# Patient Record
Sex: MALE | Race: WHITE | Employment: FULL TIME | URBAN - METROPOLITAN AREA
[De-identification: names, ages, dates, MRNs, and addresses within clinical notes are randomized per-mention and may not be internally consistent; named-entity substitution may affect disease eponyms.]

---

## 2017-05-25 ENCOUNTER — OFFICE VISIT (OUTPATIENT)
Dept: FAMILY MEDICINE CLINIC | Age: 67
End: 2017-05-25

## 2017-05-25 VITALS
BODY MASS INDEX: 31.8 KG/M2 | HEART RATE: 92 BPM | DIASTOLIC BLOOD PRESSURE: 78 MMHG | WEIGHT: 202.6 LBS | RESPIRATION RATE: 16 BRPM | SYSTOLIC BLOOD PRESSURE: 142 MMHG | HEIGHT: 67 IN

## 2017-05-25 DIAGNOSIS — E66.9 OBESITY, CLASS I, BMI 30-34.9: Primary | ICD-10-CM

## 2017-05-25 DIAGNOSIS — J45.20 RAD (REACTIVE AIRWAY DISEASE), MILD INTERMITTENT, UNCOMPLICATED: ICD-10-CM

## 2017-05-25 DIAGNOSIS — I10 ESSENTIAL HYPERTENSION: ICD-10-CM

## 2017-05-25 PROCEDURE — 1123F ACP DISCUSS/DSCN MKR DOCD: CPT | Performed by: FAMILY MEDICINE

## 2017-05-25 PROCEDURE — 99214 OFFICE O/P EST MOD 30 MIN: CPT | Performed by: FAMILY MEDICINE

## 2017-05-25 PROCEDURE — G8417 CALC BMI ABV UP PARAM F/U: HCPCS | Performed by: FAMILY MEDICINE

## 2017-05-25 PROCEDURE — 4040F PNEUMOC VAC/ADMIN/RCVD: CPT | Performed by: FAMILY MEDICINE

## 2017-05-25 PROCEDURE — 1036F TOBACCO NON-USER: CPT | Performed by: FAMILY MEDICINE

## 2017-05-25 PROCEDURE — G8427 DOCREV CUR MEDS BY ELIG CLIN: HCPCS | Performed by: FAMILY MEDICINE

## 2017-05-25 PROCEDURE — 3017F COLORECTAL CA SCREEN DOC REV: CPT | Performed by: FAMILY MEDICINE

## 2017-05-25 RX ORDER — ALBUTEROL SULFATE 90 UG/1
1-2 AEROSOL, METERED RESPIRATORY (INHALATION) EVERY 4 HOURS PRN
Qty: 1 INHALER | Refills: 3 | Status: SHIPPED | OUTPATIENT
Start: 2017-05-25 | End: 2018-04-04 | Stop reason: SDUPTHER

## 2017-05-25 ASSESSMENT — PATIENT HEALTH QUESTIONNAIRE - PHQ9
SUM OF ALL RESPONSES TO PHQ9 QUESTIONS 1 & 2: 0
1. LITTLE INTEREST OR PLEASURE IN DOING THINGS: 0
SUM OF ALL RESPONSES TO PHQ QUESTIONS 1-9: 0
2. FEELING DOWN, DEPRESSED OR HOPELESS: 0

## 2017-05-25 ASSESSMENT — ENCOUNTER SYMPTOMS
ALLERGIC/IMMUNOLOGIC NEGATIVE: 1
RESPIRATORY NEGATIVE: 1
COUGH: 0
BACK PAIN: 0
GASTROINTESTINAL NEGATIVE: 1
SHORTNESS OF BREATH: 0

## 2018-03-23 ENCOUNTER — OFFICE VISIT (OUTPATIENT)
Dept: FAMILY MEDICINE CLINIC | Age: 68
End: 2018-03-23
Payer: MEDICARE

## 2018-03-23 VITALS
DIASTOLIC BLOOD PRESSURE: 92 MMHG | TEMPERATURE: 98.1 F | BODY MASS INDEX: 30.1 KG/M2 | RESPIRATION RATE: 16 BRPM | WEIGHT: 191.8 LBS | HEIGHT: 67 IN | HEART RATE: 92 BPM | OXYGEN SATURATION: 94 % | SYSTOLIC BLOOD PRESSURE: 148 MMHG

## 2018-03-23 DIAGNOSIS — J22 LRTI (LOWER RESPIRATORY TRACT INFECTION): Primary | ICD-10-CM

## 2018-03-23 PROCEDURE — G8427 DOCREV CUR MEDS BY ELIG CLIN: HCPCS | Performed by: FAMILY MEDICINE

## 2018-03-23 PROCEDURE — 3017F COLORECTAL CA SCREEN DOC REV: CPT | Performed by: FAMILY MEDICINE

## 2018-03-23 PROCEDURE — G8417 CALC BMI ABV UP PARAM F/U: HCPCS | Performed by: FAMILY MEDICINE

## 2018-03-23 PROCEDURE — G8484 FLU IMMUNIZE NO ADMIN: HCPCS | Performed by: FAMILY MEDICINE

## 2018-03-23 PROCEDURE — 99213 OFFICE O/P EST LOW 20 MIN: CPT | Performed by: FAMILY MEDICINE

## 2018-03-23 PROCEDURE — 1036F TOBACCO NON-USER: CPT | Performed by: FAMILY MEDICINE

## 2018-03-23 PROCEDURE — 1123F ACP DISCUSS/DSCN MKR DOCD: CPT | Performed by: FAMILY MEDICINE

## 2018-03-23 PROCEDURE — 4040F PNEUMOC VAC/ADMIN/RCVD: CPT | Performed by: FAMILY MEDICINE

## 2018-03-23 RX ORDER — MULTIVIT-MIN/IRON/FOLIC ACID/K 18-600-40
4000 CAPSULE ORAL DAILY
COMMUNITY

## 2018-03-23 RX ORDER — PREDNISONE 20 MG/1
20 TABLET ORAL 2 TIMES DAILY
Qty: 10 TABLET | Refills: 0 | Status: SHIPPED | OUTPATIENT
Start: 2018-03-23 | End: 2018-03-28

## 2018-03-23 RX ORDER — LEVOFLOXACIN 750 MG/1
750 TABLET ORAL DAILY
Qty: 5 TABLET | Refills: 0 | Status: SHIPPED | OUTPATIENT
Start: 2018-03-23 | End: 2018-03-28

## 2018-03-23 RX ORDER — MAG HYDROX/ALUMINUM HYD/SIMETH 400-400-40
SUSPENSION, ORAL (FINAL DOSE FORM) ORAL 2 TIMES DAILY
Status: ON HOLD | COMMUNITY
End: 2020-03-10 | Stop reason: HOSPADM

## 2018-03-23 ASSESSMENT — ENCOUNTER SYMPTOMS
COUGH: 1
WHEEZING: 1
RHINORRHEA: 0
SINUS PAIN: 0
SHORTNESS OF BREATH: 1
GASTROINTESTINAL NEGATIVE: 1
CHEST TIGHTNESS: 1
SINUS PRESSURE: 0

## 2018-03-23 NOTE — PROGRESS NOTES
Subjective:      Patient ID: Rafa Arauz is a 80 y.o. male. HPI:    Chief Complaint   Patient presents with    Chest Congestion     C/o chest congestion and productive cough with yellow sputum x 7-9 days. Pt here for chest congestion, productive cough for almost 10 days now. No fevers. No sinus pressure or congestion. Non-smoker. Some wheezing. 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    Has Ventolin at home, has been using this also which helps. Patient Active Problem List   Diagnosis    HTN (hypertension)    AR (allergic rhinitis)    RAD (reactive airway disease)    Lower respiratory infection    Medication monitoring encounter    Obesity, Class I, BMI 30-34.9    FH: prostate cancer, father and brother.  S/P left inguinal hernia repair    S/P orchiectomy     Past Surgical History:   Procedure Laterality Date    HERNIA REPAIR  5/30/12    [facility] Dr. Celestina Estrada HERNIA REPAIR  2-4-14    Repair recurrent incarcerated left inguinal hernia with mesh    MOUTH SURGERY      several years ago-removed 2-3 back teeth-wisdom teeth     Prior to Admission medications    Medication Sig Start Date End Date Taking?  Authorizing Provider   Misc Natural Products (GLUCOSAMINE CHOND COMPLEX/MSM PO) Take by mouth daily   Yes Historical Provider, MD   Omega-3 Fatty Acids (FISH OIL PO) Take by mouth daily   Yes Historical Provider, MD   Multiple Vitamins-Minerals (MULTIVITAMIN PO) Take by mouth daily   Yes Historical Provider, MD   Coenzyme Q10 (CO Q 10 PO) Take by mouth daily   Yes Historical Provider, MD Ramos Rembert 450 MG CAPS Take by mouth 2 times daily   Yes Historical Provider, MD   Cholecalciferol (VITAMIN D) 2000 units CAPS capsule Take 4,000 Units by mouth daily   Yes Historical Provider, MD   albuterol sulfate HFA (VENTOLIN HFA) 108 (90 BASE) MCG/ACT inhaler Inhale 1-2 puffs into the lungs every 4 hours as needed for Wheezing or Shortness of Breath 5/25/17  Yes Shauna Velazquez MD         Review of Systems   Constitutional: Negative. Negative for fever. HENT: Negative. Negative for congestion, rhinorrhea, sinus pain and sinus pressure. Respiratory: Positive for cough, chest tightness, shortness of breath and wheezing. Cardiovascular: Negative. Gastrointestinal: Negative. Musculoskeletal: Negative. All other systems reviewed and are negative. Objective:   Physical Exam   Constitutional: He is oriented to person, place, and time. He appears well-developed and well-nourished. HENT:   Head: Normocephalic and atraumatic. Right Ear: Tympanic membrane normal.   Left Ear: Tympanic membrane normal.   Mouth/Throat: Oropharynx is clear and moist and mucous membranes are normal.   Cardiovascular: Normal rate, regular rhythm and normal heart sounds. No murmur heard. Pulmonary/Chest: Effort normal. He has decreased breath sounds. He has wheezes. He has rhonchi. Abdominal: Soft. Bowel sounds are normal.   Musculoskeletal: He exhibits no edema. Neurological: He is alert and oriented to person, place, and time. Skin: Skin is warm and dry. Psychiatric: He has a normal mood and affect. His behavior is normal.   Nursing note and vitals reviewed. Assessment:      1.  LRTI (lower respiratory tract infection)  levofloxacin (LEVAQUIN) 750 MG tablet    predniSONE (DELTASONE) 20 MG tablet           Plan:      -  Orders above  -  Continue Mucinex and Ventolin  -  RTO if worsening symptoms

## 2018-04-04 DIAGNOSIS — J45.20 RAD (REACTIVE AIRWAY DISEASE), MILD INTERMITTENT, UNCOMPLICATED: ICD-10-CM

## 2018-04-04 RX ORDER — ALBUTEROL SULFATE 90 UG/1
1-2 AEROSOL, METERED RESPIRATORY (INHALATION) EVERY 4 HOURS PRN
Qty: 1 INHALER | Refills: 3 | Status: SHIPPED | OUTPATIENT
Start: 2018-04-04 | End: 2019-07-22 | Stop reason: SDUPTHER

## 2018-05-24 ENCOUNTER — OFFICE VISIT (OUTPATIENT)
Dept: FAMILY MEDICINE CLINIC | Age: 68
End: 2018-05-24
Payer: MEDICARE

## 2018-05-24 VITALS
HEIGHT: 67 IN | OXYGEN SATURATION: 98 % | WEIGHT: 193.1 LBS | BODY MASS INDEX: 30.31 KG/M2 | HEART RATE: 82 BPM | SYSTOLIC BLOOD PRESSURE: 128 MMHG | DIASTOLIC BLOOD PRESSURE: 76 MMHG | RESPIRATION RATE: 13 BRPM

## 2018-05-24 DIAGNOSIS — Z00.00 ROUTINE GENERAL MEDICAL EXAMINATION AT A HEALTH CARE FACILITY: Primary | ICD-10-CM

## 2018-05-24 DIAGNOSIS — Z00.00 MEDICARE WELCOME EXAM: ICD-10-CM

## 2018-05-24 PROCEDURE — G0438 PPPS, INITIAL VISIT: HCPCS | Performed by: FAMILY MEDICINE

## 2018-05-24 PROCEDURE — 4040F PNEUMOC VAC/ADMIN/RCVD: CPT | Performed by: FAMILY MEDICINE

## 2018-05-24 ASSESSMENT — ANXIETY QUESTIONNAIRES: GAD7 TOTAL SCORE: 0

## 2018-05-24 ASSESSMENT — LIFESTYLE VARIABLES: HOW OFTEN DO YOU HAVE A DRINK CONTAINING ALCOHOL: 0

## 2018-05-24 ASSESSMENT — PATIENT HEALTH QUESTIONNAIRE - PHQ9: SUM OF ALL RESPONSES TO PHQ QUESTIONS 1-9: 0

## 2018-05-30 ENCOUNTER — TELEPHONE (OUTPATIENT)
Dept: FAMILY MEDICINE CLINIC | Age: 68
End: 2018-05-30

## 2018-05-30 DIAGNOSIS — I10 ESSENTIAL HYPERTENSION: ICD-10-CM

## 2018-05-30 RX ORDER — CLONIDINE HYDROCHLORIDE 0.2 MG/1
TABLET ORAL
Qty: 180 TABLET | Refills: 3 | Status: SHIPPED | OUTPATIENT
Start: 2018-05-30 | End: 2019-05-24 | Stop reason: SDUPTHER

## 2018-05-30 RX ORDER — HYDROCHLOROTHIAZIDE 25 MG/1
25 TABLET ORAL DAILY
Qty: 90 TABLET | Refills: 3 | Status: SHIPPED | OUTPATIENT
Start: 2018-05-30 | End: 2019-05-24 | Stop reason: SDUPTHER

## 2018-05-30 RX ORDER — LOSARTAN POTASSIUM 100 MG/1
TABLET ORAL
Qty: 90 TABLET | Refills: 3 | Status: SHIPPED | OUTPATIENT
Start: 2018-05-30 | End: 2019-05-24

## 2018-06-23 PROBLEM — Z00.00 MEDICARE WELCOME EXAM: Status: RESOLVED | Noted: 2018-05-24 | Resolved: 2018-06-23

## 2019-01-07 ENCOUNTER — OFFICE VISIT (OUTPATIENT)
Dept: FAMILY MEDICINE CLINIC | Age: 69
End: 2019-01-07
Payer: MEDICARE

## 2019-01-07 VITALS
DIASTOLIC BLOOD PRESSURE: 80 MMHG | HEIGHT: 67 IN | OXYGEN SATURATION: 97 % | HEART RATE: 102 BPM | WEIGHT: 201.3 LBS | TEMPERATURE: 98.6 F | SYSTOLIC BLOOD PRESSURE: 128 MMHG | BODY MASS INDEX: 31.6 KG/M2 | RESPIRATION RATE: 16 BRPM

## 2019-01-07 DIAGNOSIS — J32.9 SINOBRONCHITIS: ICD-10-CM

## 2019-01-07 DIAGNOSIS — J40 SINOBRONCHITIS: ICD-10-CM

## 2019-01-07 DIAGNOSIS — I10 ESSENTIAL HYPERTENSION: Primary | ICD-10-CM

## 2019-01-07 PROCEDURE — 4040F PNEUMOC VAC/ADMIN/RCVD: CPT | Performed by: FAMILY MEDICINE

## 2019-01-07 PROCEDURE — G8427 DOCREV CUR MEDS BY ELIG CLIN: HCPCS | Performed by: FAMILY MEDICINE

## 2019-01-07 PROCEDURE — G8417 CALC BMI ABV UP PARAM F/U: HCPCS | Performed by: FAMILY MEDICINE

## 2019-01-07 PROCEDURE — 1036F TOBACCO NON-USER: CPT | Performed by: FAMILY MEDICINE

## 2019-01-07 PROCEDURE — G8484 FLU IMMUNIZE NO ADMIN: HCPCS | Performed by: FAMILY MEDICINE

## 2019-01-07 PROCEDURE — 1123F ACP DISCUSS/DSCN MKR DOCD: CPT | Performed by: FAMILY MEDICINE

## 2019-01-07 PROCEDURE — 1101F PT FALLS ASSESS-DOCD LE1/YR: CPT | Performed by: FAMILY MEDICINE

## 2019-01-07 PROCEDURE — 3017F COLORECTAL CA SCREEN DOC REV: CPT | Performed by: FAMILY MEDICINE

## 2019-01-07 PROCEDURE — 99213 OFFICE O/P EST LOW 20 MIN: CPT | Performed by: FAMILY MEDICINE

## 2019-01-07 RX ORDER — AZITHROMYCIN 250 MG/1
TABLET, FILM COATED ORAL
Qty: 1 PACKET | Refills: 0 | Status: SHIPPED | OUTPATIENT
Start: 2019-01-07 | End: 2019-04-24 | Stop reason: ALTCHOICE

## 2019-01-07 ASSESSMENT — ENCOUNTER SYMPTOMS
RHINORRHEA: 1
SHORTNESS OF BREATH: 0
WHEEZING: 1
SINUS PAIN: 0
COUGH: 1
SINUS PRESSURE: 1

## 2019-02-26 ENCOUNTER — TELEPHONE (OUTPATIENT)
Dept: FAMILY MEDICINE CLINIC | Age: 69
End: 2019-02-26

## 2019-04-24 ENCOUNTER — OFFICE VISIT (OUTPATIENT)
Dept: FAMILY MEDICINE CLINIC | Age: 69
End: 2019-04-24
Payer: MEDICARE

## 2019-04-24 VITALS
WEIGHT: 203.9 LBS | HEART RATE: 80 BPM | DIASTOLIC BLOOD PRESSURE: 96 MMHG | SYSTOLIC BLOOD PRESSURE: 160 MMHG | BODY MASS INDEX: 32 KG/M2 | HEIGHT: 67 IN | RESPIRATION RATE: 20 BRPM

## 2019-04-24 DIAGNOSIS — R22.31 LOCALIZED SWELLING ON RIGHT HAND: Primary | ICD-10-CM

## 2019-04-24 PROCEDURE — 1123F ACP DISCUSS/DSCN MKR DOCD: CPT | Performed by: FAMILY MEDICINE

## 2019-04-24 PROCEDURE — 1036F TOBACCO NON-USER: CPT | Performed by: FAMILY MEDICINE

## 2019-04-24 PROCEDURE — 99213 OFFICE O/P EST LOW 20 MIN: CPT | Performed by: FAMILY MEDICINE

## 2019-04-24 PROCEDURE — G8427 DOCREV CUR MEDS BY ELIG CLIN: HCPCS | Performed by: FAMILY MEDICINE

## 2019-04-24 PROCEDURE — 4040F PNEUMOC VAC/ADMIN/RCVD: CPT | Performed by: FAMILY MEDICINE

## 2019-04-24 PROCEDURE — G8417 CALC BMI ABV UP PARAM F/U: HCPCS | Performed by: FAMILY MEDICINE

## 2019-04-24 PROCEDURE — 3017F COLORECTAL CA SCREEN DOC REV: CPT | Performed by: FAMILY MEDICINE

## 2019-04-24 PROCEDURE — G8510 SCR DEP NEG, NO PLAN REQD: HCPCS | Performed by: FAMILY MEDICINE

## 2019-04-24 RX ORDER — CEFADROXIL 500 MG/1
500 CAPSULE ORAL 2 TIMES DAILY
Qty: 20 CAPSULE | Refills: 0 | Status: SHIPPED | OUTPATIENT
Start: 2019-04-24 | End: 2019-05-04

## 2019-04-24 RX ORDER — PREDNISONE 20 MG/1
20 TABLET ORAL 2 TIMES DAILY
Qty: 14 TABLET | Refills: 0 | Status: SHIPPED | OUTPATIENT
Start: 2019-04-24 | End: 2019-05-01

## 2019-04-24 ASSESSMENT — ENCOUNTER SYMPTOMS
GASTROINTESTINAL NEGATIVE: 1
RESPIRATORY NEGATIVE: 1

## 2019-04-24 ASSESSMENT — PATIENT HEALTH QUESTIONNAIRE - PHQ9
1. LITTLE INTEREST OR PLEASURE IN DOING THINGS: 0
SUM OF ALL RESPONSES TO PHQ9 QUESTIONS 1 & 2: 0
2. FEELING DOWN, DEPRESSED OR HOPELESS: 0
SUM OF ALL RESPONSES TO PHQ QUESTIONS 1-9: 0
SUM OF ALL RESPONSES TO PHQ QUESTIONS 1-9: 0

## 2019-04-24 NOTE — PROGRESS NOTES
Visit Information    Have you changed or started any medications since your last visit including any over-the-counter medicines, vitamins, or herbal medicines? no   Are you having any side effects from any of your medications? -  no  Have you stopped taking any of your medications? Is so, why? -  no    Have you seen any other physician or provider since your last visit? No  Have you had any other diagnostic tests since your last visit? No  Have you been seen in the emergency room and/or had an admission to a hospital since we last saw you? No  Have you had your routine dental cleaning in the past 6 months? no    Have you activated your [employer] account? If not, what are your barriers?  Yes     Patient Care Team:  Grecia Ni MD as PCP - General (Family Medicine)  Grecia Ni MD as PCP - [facility] Attributed Provider    Medical History Review  Past Medical, Family, and Social History reviewed and does not contribute to the patient presenting condition    Health Maintenance   Topic Date Due    DTaP/Tdap/Td vaccine (1 - Tdap) 01/12/1970    Shingles Vaccine (1 of 2) 01/12/2001    Colon cancer screen colonoscopy  01/12/2001    Pneumococcal 65+ years Vaccine (1 of 2 - PCV13) 01/12/2016    Potassium monitoring  05/05/2019    Creatinine monitoring  05/05/2019    Flu vaccine (Season Ended) 01/07/2020 (Originally 9/1/2019)    Lipid screen  05/05/2023    Hepatitis C screen  Completed
(MULTIVITAMIN PO) Take by mouth daily   Yes Historical Provider, MD   Coenzyme Q10 (CO Q 10 PO) Take by mouth daily   Yes Historical Provider, MD Ramos Granada 450 MG CAPS Take by mouth 2 times daily   Yes Historical Provider, MD   Cholecalciferol (VITAMIN D) 2000 units CAPS capsule Take 4,000 Units by mouth daily   Yes Historical Provider, MD         Review of Systems   Constitutional: Negative. HENT: Negative. Respiratory: Negative. Cardiovascular: Negative. Gastrointestinal: Negative. Musculoskeletal: Positive for arthralgias and joint swelling (right hand redness and swelling). All other systems reviewed and are negative. Objective:   Physical Exam   Constitutional: He is oriented to person, place, and time. He appears well-developed and well-nourished. HENT:   Head: Normocephalic and atraumatic. Right Ear: Tympanic membrane normal.   Left Ear: Tympanic membrane normal.   Mouth/Throat: Oropharynx is clear and moist and mucous membranes are normal.   Cardiovascular: Normal rate, regular rhythm and normal heart sounds. No murmur heard. Pulmonary/Chest: Effort normal and breath sounds normal.   Abdominal: Soft. Bowel sounds are normal.   Musculoskeletal: He exhibits no edema. Hands:  Neurological: He is alert and oriented to person, place, and time. Skin: Skin is warm and dry. Psychiatric: He has a normal mood and affect. His behavior is normal.   Nursing note and vitals reviewed. Assessment:       Diagnosis Orders   1.  Localized swelling on right hand  cefadroxil (DURICEF) 500 MG capsule    predniSONE (DELTASONE) 20 MG tablet         Plan:      -  This is likely inflammatory  -  That being said, will cover also for infection  -  RTO if worsening symptoms        Nish Simmons, DO

## 2019-05-06 ENCOUNTER — OFFICE VISIT (OUTPATIENT)
Dept: FAMILY MEDICINE CLINIC | Age: 69
End: 2019-05-06
Payer: MEDICARE

## 2019-05-06 VITALS
HEART RATE: 80 BPM | RESPIRATION RATE: 20 BRPM | DIASTOLIC BLOOD PRESSURE: 88 MMHG | SYSTOLIC BLOOD PRESSURE: 160 MMHG | WEIGHT: 205.1 LBS | BODY MASS INDEX: 32.19 KG/M2 | HEIGHT: 67 IN

## 2019-05-06 DIAGNOSIS — M77.8 TENDONITIS OF RIGHT HAND: Primary | ICD-10-CM

## 2019-05-06 PROCEDURE — 1123F ACP DISCUSS/DSCN MKR DOCD: CPT | Performed by: FAMILY MEDICINE

## 2019-05-06 PROCEDURE — 3017F COLORECTAL CA SCREEN DOC REV: CPT | Performed by: FAMILY MEDICINE

## 2019-05-06 PROCEDURE — G8417 CALC BMI ABV UP PARAM F/U: HCPCS | Performed by: FAMILY MEDICINE

## 2019-05-06 PROCEDURE — 4040F PNEUMOC VAC/ADMIN/RCVD: CPT | Performed by: FAMILY MEDICINE

## 2019-05-06 PROCEDURE — 1036F TOBACCO NON-USER: CPT | Performed by: FAMILY MEDICINE

## 2019-05-06 PROCEDURE — G8427 DOCREV CUR MEDS BY ELIG CLIN: HCPCS | Performed by: FAMILY MEDICINE

## 2019-05-06 PROCEDURE — 99213 OFFICE O/P EST LOW 20 MIN: CPT | Performed by: FAMILY MEDICINE

## 2019-05-06 PROCEDURE — 20550 NJX 1 TENDON SHEATH/LIGAMENT: CPT | Performed by: FAMILY MEDICINE

## 2019-05-06 RX ORDER — METHYLPREDNISOLONE ACETATE 40 MG/ML
40 INJECTION, SUSPENSION INTRA-ARTICULAR; INTRALESIONAL; INTRAMUSCULAR; SOFT TISSUE ONCE
Status: COMPLETED | OUTPATIENT
Start: 2019-05-06 | End: 2019-05-06

## 2019-05-06 RX ADMIN — METHYLPREDNISOLONE ACETATE 40 MG: 40 INJECTION, SUSPENSION INTRA-ARTICULAR; INTRALESIONAL; INTRAMUSCULAR; SOFT TISSUE at 12:55
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
## 2019-05-06 NOTE — PATIENT INSTRUCTIONS
You may receive a survey about your visit with us today. The feedback from our patients helps us identify what is working well and where the service to all patients can be enhanced. Thank you! Ice joint for 15 minutes 4 times over the next 24 hours then as needed.

## 2019-05-06 NOTE — PROGRESS NOTES
Administrations This Visit     methylPREDNISolone acetate (DEPO-MEDROL) injection 40 mg     Admin Date  05/06/2019 Action  Given by Other Dose  40 mg Route  Intramuscular Administered By  Elsie Blue CMA ([facility])              Injection in right hand tendon sheath mixed with 1mg Sensorcaine NDC 74139-777-68 Lot [id number] Exp 9/22.  Injection given by Dr Cara Mercer and medicatin drawn up by Elsie Blue CMA ([address] [place] [address])

## 2019-05-24 ENCOUNTER — OFFICE VISIT (OUTPATIENT)
Dept: FAMILY MEDICINE CLINIC | Age: 69
End: 2019-05-24
Payer: MEDICARE

## 2019-05-24 VITALS
HEIGHT: 67 IN | HEART RATE: 76 BPM | SYSTOLIC BLOOD PRESSURE: 136 MMHG | DIASTOLIC BLOOD PRESSURE: 82 MMHG | BODY MASS INDEX: 31.89 KG/M2 | WEIGHT: 203.2 LBS | RESPIRATION RATE: 16 BRPM

## 2019-05-24 DIAGNOSIS — I10 ESSENTIAL HYPERTENSION: Primary | ICD-10-CM

## 2019-05-24 DIAGNOSIS — Z00.00 ROUTINE GENERAL MEDICAL EXAMINATION AT A HEALTH CARE FACILITY: ICD-10-CM

## 2019-05-24 PROCEDURE — 4040F PNEUMOC VAC/ADMIN/RCVD: CPT | Performed by: FAMILY MEDICINE

## 2019-05-24 PROCEDURE — 3017F COLORECTAL CA SCREEN DOC REV: CPT | Performed by: FAMILY MEDICINE

## 2019-05-24 PROCEDURE — 1123F ACP DISCUSS/DSCN MKR DOCD: CPT | Performed by: FAMILY MEDICINE

## 2019-05-24 PROCEDURE — G0439 PPPS, SUBSEQ VISIT: HCPCS | Performed by: FAMILY MEDICINE

## 2019-05-24 RX ORDER — HYDROCHLOROTHIAZIDE 25 MG/1
25 TABLET ORAL DAILY
Qty: 90 TABLET | Refills: 3 | Status: SHIPPED | OUTPATIENT
Start: 2019-05-24

## 2019-05-24 RX ORDER — CLONIDINE HYDROCHLORIDE 0.2 MG/1
TABLET ORAL
Qty: 180 TABLET | Refills: 3 | Status: ON HOLD
Start: 2019-05-24 | End: 2020-03-10 | Stop reason: HOSPADM

## 2019-05-24 ASSESSMENT — PATIENT HEALTH QUESTIONNAIRE - PHQ9
SUM OF ALL RESPONSES TO PHQ QUESTIONS 1-9: 0
SUM OF ALL RESPONSES TO PHQ QUESTIONS 1-9: 0

## 2019-05-24 ASSESSMENT — LIFESTYLE VARIABLES: HOW OFTEN DO YOU HAVE A DRINK CONTAINING ALCOHOL: 0

## 2019-05-24 ASSESSMENT — ANXIETY QUESTIONNAIRES: GAD7 TOTAL SCORE: 0

## 2019-05-24 NOTE — PROGRESS NOTES
Chronic Disease Visit Information    BP Readings from Last 3 Encounters:   05/24/19 136/82   05/06/19 (!) 160/88   04/24/19 (!) 160/96          LDL Calculated (mg/dL)   Date Value   05/04/2019 116 (H)     HDL (mg/dL)   Date Value   05/04/2019 52     BUN (mg/dL)   Date Value   05/04/2019 22 (H)     CREATININE (mg/dL)   Date Value   05/04/2019 0.99     Glucose (mg/dL)   Date Value   05/04/2019 96            Have you changed or started any medications since your last visit including any over-the-counter medicines, vitamins, or herbal medicines? no   Are you having any side effects from any of your medications? -  no  Have you stopped taking any of your medications? Is so, why? -  no    Have you seen any other physician or provider since your last visit? No  Have you had any other diagnostic tests since your last visit? Yes - Records Obtained  Have you been seen in the emergency room and/or had an admission to a hospital since we last saw you? No  Have you had your annual diabetic retinal (eye) exam? No  Have you had your routine dental cleaning in the past 6 months? n/a    Have you activated your [employer] account? If not, what are your barriers?  Yes     Patient Care Team:  Michael Ferrer MD as PCP - General (Family Medicine)  Michael Ferrer MD as PCP - [facility] Attributed Provider         Medical History Review  Past Medical, Family, and Social History reviewed and does contribute to the patient presenting condition    Health Maintenance   Topic Date Due    DTaP/Tdap/Td vaccine (1 - Tdap) 01/12/1970    Shingles Vaccine (1 of 2) 01/12/2001    Colon cancer screen colonoscopy  01/12/2001    Pneumococcal 65+ years Vaccine (1 of 2 - PCV13) 01/12/2016    Flu vaccine (Season Ended) 01/07/2020 (Originally 9/1/2019)    Potassium monitoring  05/04/2020    Creatinine monitoring  05/04/2020    Lipid screen  05/04/2024    Hepatitis C screen  Completed

## 2019-05-24 NOTE — PROGRESS NOTES
Medicare Annual Wellness Visit  Name: Tez Robles Date: 2019   MRN: [id number] Sex: Male   Age: 77 y.o. Ethnicity: Non-/Non    : 1950 Race: Baudilio Rowe is here for Medicare AWV; 6 Month Follow-Up; Hypertension; and Results    Screenings for behavioral, psychosocial and functional/safety risks, and cognitive dysfunction are all negative except as indicated below. These results, as well as other patient data from the [address] form, are documented in Flowsheets linked to this Encounter. Allergies   Allergen Reactions    Ace Inhibitors Other (See Comments)     cough    Aleve [Naproxen Sodium] Other (See Comments)     Skin peels off    Percocet [Oxycodone-Acetaminophen] Other (See Comments)     High BP  Insomnia  hyper     Prior to Visit Medications    Medication Sig Taking?  Authorizing Provider   cloNIDine (CATAPRES) 0.2 MG tablet TAKE ONE TABLET BY MOUTH TWICE DAILY Yes Eriberto Faith MD   hydrochlorothiazide (HYDRODIURIL) 25 MG tablet Take 1 tablet by mouth daily Yes Eriberto Faith MD   MILK THISTLE PO Take 1 tablet by mouth daily Yes Historical Provider, MD   albuterol sulfate HFA (VENTOLIN HFA) 108 (90 Base) MCG/ACT inhaler Inhale 1-2 puffs into the lungs every 4 hours as needed for Wheezing or Shortness of Breath Yes Eriberto Faith MD   Misc Natural Products ([place] K[place] COMPLEX/MSM PO) Take by mouth daily Yes Historical Provider, MD   Omega-3 Fatty Acids (FISH OIL PO) Take by mouth daily Yes Historical Provider, MD   Multiple Vitamins-Minerals (MULTIVITAMIN PO) Take by mouth daily Yes Historical Provider, MD   Coenzyme Q10 (CO Q 10 PO) Take by mouth daily Yes Historical Provider, MD Richard Garcia 450 MG CAPS Take by mouth 2 times daily Yes Historical Provider, MD   Cholecalciferol (VITAMIN D) 2000 units CAPS capsule Take 4,000 Units by mouth daily Yes Historical Provider, MD     Past Medical History:   Diagnosis Date    Hypertension      Past Surgical History:   Procedure Laterality Date    HERNIA REPAIR  5/30/12    [facility] Dr. Jon Willis    [address]  2-4-14    Repair recurrent incarcerated left inguinal hernia with mesh    MOUTH SURGERY      several years ago-removed 2-3 back teeth-wisdom teeth     Family History   Problem Relation Age of Onset    Alzheimer's Disease Mother     High Blood Pressure Mother     Cancer Father         prostate    Cancer Sister         breast cancer-twin       CareTeam (Including outside providers/suppliers regularly involved in providing care):   Patient Care Team:  Sari Arellano MD as PCP - General (Family Medicine)  Sari Arellano MD as PCP - S Attributed Provider    Wt Readings from Last 3 Encounters:   05/24/19 203 lb 3.2 oz (92.2 kg)   05/06/19 205 lb 1.6 oz (93 kg)   04/24/19 203 lb 14.4 oz (92.5 kg)     Vitals:    05/24/19 1020   BP: 136/82   Site: Left Upper Arm   Position: Sitting   Cuff Size: Large Adult   Pulse: 76   Resp: 16   Weight: 203 lb 3.2 oz (92.2 kg)   Height: 5' 6.5\" (1.689 m)     Body mass index is 32.31 kg/m². Based upon direct observation of the patient, evaluation of cognition reveals recent and remote memory intact.     General Appearance: alert and oriented to person, place and time, well developed and well- nourished, in no acute distress  Skin: warm and dry, no rash or erythema  Head: normocephalic and atraumatic  Eyes: pupils equal, round, and reactive to light, extraocular eye movements intact, conjunctivae normal  ENT: tympanic membrane, external ear and ear canal normal bilaterally, nose without deformity, nasal mucosa and turbinates normal without polyps  Neck: supple and non-tender without mass, no thyromegaly or thyroid nodules, no cervical lymphadenopathy  Pulmonary/Chest: clear to auscultation bilaterally- no wheezes, rales or rhonchi, normal air movement, no respiratory distress  Cardiovascular: normal rate, regular rhythm, normal S1 and S2, no murmurs, rubs, clicks, or gallops, distal pulses intact, no carotid bruits  Abdomen: soft, non-tender, non-distended, normal bowel sounds, no masses or organomegaly  Extremities: no cyanosis, clubbing or edema  Musculoskeletal: normal range of motion, no joint swelling, deformity or tenderness  Neurologic: reflexes normal and symmetric, no cranial nerve deficit, gait, coordination and speech normal    Patient's complete Health Risk Assessment and screening values have been reviewed and are found in Flowsheets. The following problems were reviewed today and where indicated follow up appointments were made and/or referrals ordered. Positive Risk Factor Screenings with Interventions:     General Health:  General  In general, how would you say your health is?: Excellent  In the past 7 days, have you experienced any of the following? New or Increased Pain, New or Increased Fatigue, Loneliness, Social Isolation, Stress or Anger?: None of These  Do you get the social and emotional support that you need?: Yes  Do you have a Living Will?: (!) No  General Health Risk Interventions:  · none. Health Habits/Nutrition:  Health Habits/Nutrition  Do you exercise for at least 20 minutes 2-3 times per week?: Yes  Have you lost any weight without trying in the past 3 months?: No  Do you eat fewer than 2 meals per day?: No  Have you seen a dentist within the past year?: (!) No  Body mass index is 32.31 kg/m². Health Habits/Nutrition Interventions:  · none    Personalized Preventive Plan   Current Health Maintenance Status    There is no immunization history on file for this patient.      Health Maintenance   Topic Date Due    DTaP/Tdap/Td vaccine (1 - Tdap) 01/12/1970    Shingles Vaccine (1 of 2) 01/12/2001    Colon cancer screen colonoscopy  01/12/2001    Pneumococcal 65+ years Vaccine (1 of 2 - PCV13) 01/12/2016    Flu vaccine (Season Ended) 01/07/2020 (Originally 9/1/2019)    Potassium monitoring  05/04/2020    Creatinine monitoring  05/04/2020  Lipid screen  05/04/2024    Hepatitis C screen  Completed     Recommendations for Preventive Services Due: see orders and patient instructions/AVS.  .   Recommended screening schedule for the next 5-10 years is provided to the patient in written form: see Patient Instructions/AVS.

## 2019-05-24 NOTE — PATIENT INSTRUCTIONS
You may receive a survey about your visit with us today. The feedback from our patients helps us identify what is working well and where the service to all patients can be enhanced. Thank you! Personalized Preventive Plan for Jass Valladares - 5/24/2019  Medicare offers a range of preventive health benefits. Some of the tests and screenings are paid in full while other may be subject to a deductible, co-insurance, and/or copay. Some of these benefits include a comprehensive review of your medical history including lifestyle, illnesses that may run in your family, and various assessments and screenings as appropriate. After reviewing your medical record and screening and assessments performed today your provider may have ordered immunizations, labs, imaging, and/or referrals for you. A list of these orders (if applicable) as well as your Preventive Care list are included within your After Visit Summary for your review. Other Preventive Recommendations:    · A preventive eye exam performed by an eye specialist is recommended every 1-2 years to screen for glaucoma; cataracts, macular degeneration, and other eye disorders. · A preventive dental visit is recommended every 6 months. · Try to get at least 150 minutes of exercise per week or 10,000 steps per day on a pedometer . · Order or download the FREE \"Exercise & Physical Activity: Your Everyday Guide\" from [employer]. Call 2-325.879.4291 or search [employer] online. · You need 1198-1420 mg of calcium and 7728-9925 IU of vitamin D per day. It is possible to meet your calcium requirement with diet alone, but a vitamin D supplement is usually necessary to meet this goal.  · When exposed to the sun, use a sunscreen that protects against both UVA and UVB radiation with an SPF of 30 or greater. Reapply every 2 to 3 hours or after sweating, drying off with a towel, or swimming.   · Always wear a seat belt when

## 2019-07-22 DIAGNOSIS — J45.20 RAD (REACTIVE AIRWAY DISEASE), MILD INTERMITTENT, UNCOMPLICATED: ICD-10-CM

## 2019-07-22 RX ORDER — ALBUTEROL SULFATE 90 UG/1
AEROSOL, METERED RESPIRATORY (INHALATION)
Qty: 18 G | Refills: 3 | Status: SHIPPED | OUTPATIENT
Start: 2019-07-22 | End: 2020-08-03 | Stop reason: SDUPTHER

## 2019-07-27 ENCOUNTER — HOSPITAL ENCOUNTER (EMERGENCY)
Age: 69
Discharge: HOME | End: 2019-07-27
Payer: MEDICARE

## 2019-07-27 VITALS
HEIGHT: 67 IN | WEIGHT: 192 LBS | TEMPERATURE: 97.7 F | SYSTOLIC BLOOD PRESSURE: 139 MMHG | BODY MASS INDEX: 30.13 KG/M2 | RESPIRATION RATE: 16 BRPM | OXYGEN SATURATION: 98 % | HEART RATE: 82 BPM | DIASTOLIC BLOOD PRESSURE: 88 MMHG

## 2019-07-27 DIAGNOSIS — J22 LOWER RESP. TRACT INFECTION: Primary | ICD-10-CM

## 2019-07-27 PROCEDURE — 99214 OFFICE O/P EST MOD 30 MIN: CPT | Performed by: NURSE PRACTITIONER

## 2019-07-27 PROCEDURE — 99213 OFFICE O/P EST LOW 20 MIN: CPT

## 2019-07-27 RX ORDER — DEXTROMETHORPHAN HYDROBROMIDE AND PROMETHAZINE HYDROCHLORIDE 15; 6.25 MG/5ML; MG/5ML
5 SYRUP ORAL 4 TIMES DAILY PRN
Qty: 180 ML | Refills: 0 | Status: SHIPPED | OUTPATIENT
Start: 2019-07-27 | End: 2019-08-03

## 2019-07-27 RX ORDER — PREDNISONE 20 MG/1
20 TABLET ORAL 2 TIMES DAILY
Qty: 10 TABLET | Refills: 0 | Status: SHIPPED | OUTPATIENT
Start: 2019-07-27 | End: 2019-08-01

## 2019-07-27 RX ORDER — LEVOFLOXACIN 500 MG/1
500 TABLET, FILM COATED ORAL DAILY
Qty: 10 TABLET | Refills: 0 | Status: SHIPPED | OUTPATIENT
Start: 2019-07-27 | End: 2019-08-06

## 2019-07-27 RX ORDER — LORATADINE 10 MG/1
10 CAPSULE, LIQUID FILLED ORAL DAILY
COMMUNITY

## 2019-07-27 ASSESSMENT — ENCOUNTER SYMPTOMS
ALLERGIC/IMMUNOLOGIC NEGATIVE: 1
EYES NEGATIVE: 1
SORE THROAT: 1
ABDOMINAL PAIN: 0
BLOOD IN STOOL: 0
VOMITING: 0
COUGH: 1
ABDOMINAL DISTENTION: 0
SINUS PRESSURE: 1
SINUS PAIN: 1

## 2019-07-29 ENCOUNTER — TELEPHONE (OUTPATIENT)
Dept: FAMILY MEDICINE CLINIC | Age: 69
End: 2019-07-29

## 2020-03-08 ENCOUNTER — APPOINTMENT (OUTPATIENT)
Dept: CT IMAGING | Age: 70
DRG: 287 | End: 2020-03-08
Payer: MEDICARE

## 2020-03-08 ENCOUNTER — HOSPITAL ENCOUNTER (INPATIENT)
Age: 70
LOS: 2 days | Discharge: HOME | DRG: 287 | End: 2020-03-10
Attending: EMERGENCY MEDICINE | Admitting: INTERNAL MEDICINE
Payer: MEDICARE

## 2020-03-08 ENCOUNTER — APPOINTMENT (OUTPATIENT)
Dept: GENERAL RADIOLOGY | Age: 70
DRG: 287 | End: 2020-03-08
Payer: MEDICARE

## 2020-03-08 PROBLEM — R07.9 CHEST PAIN WITH HIGH RISK FOR CARDIAC ETIOLOGY: Status: ACTIVE | Noted: 2020-03-08

## 2020-03-08 PROBLEM — I20.0 UNSTABLE ANGINA PECTORIS (HCC): Status: ACTIVE | Noted: 2020-03-08

## 2020-03-08 LAB
ALBUMIN SERPL-MCNC: 4.2 G/DL (ref 3.5–5.1)
ALP BLD-CCNC: 71 U/L (ref 38–126)
ALT SERPL-CCNC: 27 U/L (ref 11–66)
ANION GAP SERPL CALCULATED.3IONS-SCNC: 14 MEQ/L (ref 8–16)
AST SERPL-CCNC: 21 U/L (ref 5–40)
BASOPHILS # BLD: 0.5 %
BASOPHILS ABSOLUTE: 0 THOU/MM3 (ref 0–0.1)
BILIRUB SERPL-MCNC: 0.5 MG/DL (ref 0.3–1.2)
BILIRUBIN DIRECT: < 0.2 MG/DL (ref 0–0.3)
BUN BLDV-MCNC: 20 MG/DL (ref 7–22)
CALCIUM SERPL-MCNC: 9.6 MG/DL (ref 8.5–10.5)
CHLORIDE BLD-SCNC: 101 MEQ/L (ref 98–111)
CO2: 24 MEQ/L (ref 23–33)
CREAT SERPL-MCNC: 0.8 MG/DL (ref 0.4–1.2)
D-DIMER QUANTITATIVE: 229 NG/ML FEU (ref 0–500)
EKG ATRIAL RATE: 74 BPM
EKG ATRIAL RATE: 96 BPM
EKG P AXIS: 53 DEGREES
EKG P AXIS: 60 DEGREES
EKG P-R INTERVAL: 154 MS
EKG P-R INTERVAL: 174 MS
EKG Q-T INTERVAL: 372 MS
EKG Q-T INTERVAL: 412 MS
EKG QRS DURATION: 106 MS
EKG QRS DURATION: 118 MS
EKG QTC CALCULATION (BAZETT): 457 MS
EKG QTC CALCULATION (BAZETT): 469 MS
EKG R AXIS: -50 DEGREES
EKG R AXIS: -58 DEGREES
EKG T AXIS: 16 DEGREES
EKG T AXIS: 33 DEGREES
EKG VENTRICULAR RATE: 74 BPM
EKG VENTRICULAR RATE: 96 BPM
EOSINOPHIL # BLD: 1.5 %
EOSINOPHILS ABSOLUTE: 0.1 THOU/MM3 (ref 0–0.4)
ERYTHROCYTE [DISTWIDTH] IN BLOOD BY AUTOMATED COUNT: 12.5 % (ref 11.5–14.5)
ERYTHROCYTE [DISTWIDTH] IN BLOOD BY AUTOMATED COUNT: 41.4 FL (ref 35–45)
GFR SERPL CREATININE-BSD FRML MDRD: > 90 ML/MIN/1.73M2
GLUCOSE BLD-MCNC: 127 MG/DL (ref 70–108)
HCT VFR BLD CALC: 47.8 % (ref 42–52)
HEMOGLOBIN: 16.2 GM/DL (ref 14–18)
IMMATURE GRANS (ABS): 0.01 THOU/MM3 (ref 0–0.07)
IMMATURE GRANULOCYTES: 0.1 %
LIPASE: 28.2 U/L (ref 5.6–51.3)
LYMPHOCYTES # BLD: 13.8 %
LYMPHOCYTES ABSOLUTE: 1 THOU/MM3 (ref 1–4.8)
MCH RBC QN AUTO: 30.9 PG (ref 26–33)
MCHC RBC AUTO-ENTMCNC: 33.9 GM/DL (ref 32.2–35.5)
MCV RBC AUTO: 91 FL (ref 80–94)
MONOCYTES # BLD: 11.1 %
MONOCYTES ABSOLUTE: 0.8 THOU/MM3 (ref 0.4–1.3)
NUCLEATED RED BLOOD CELLS: 0 /100 WBC
OSMOLALITY CALCULATION: 281.7 MOSMOL/KG (ref 275–300)
PLATELET # BLD: 209 THOU/MM3 (ref 130–400)
PMV BLD AUTO: 11.2 FL (ref 9.4–12.4)
POTASSIUM SERPL-SCNC: 3.6 MEQ/L (ref 3.5–5.2)
RBC # BLD: 5.25 MILL/MM3 (ref 4.7–6.1)
SEG NEUTROPHILS: 73 %
SEGMENTED NEUTROPHILS ABSOLUTE COUNT: 5.5 THOU/MM3 (ref 1.8–7.7)
SODIUM BLD-SCNC: 139 MEQ/L (ref 135–145)
TOTAL PROTEIN: 7.1 G/DL (ref 6.1–8)
TROPONIN T: < 0.01 NG/ML
WBC # BLD: 7.5 THOU/MM3 (ref 4.8–10.8)

## 2020-03-08 PROCEDURE — C9113 INJ PANTOPRAZOLE SODIUM, VIA: HCPCS | Performed by: INTERNAL MEDICINE

## 2020-03-08 PROCEDURE — C1769 GUIDE WIRE: HCPCS

## 2020-03-08 PROCEDURE — 93458 L HRT ARTERY/VENTRICLE ANGIO: CPT | Performed by: INTERNAL MEDICINE

## 2020-03-08 PROCEDURE — 85025 COMPLETE CBC W/AUTO DIFF WBC: CPT

## 2020-03-08 PROCEDURE — B2111ZZ FLUOROSCOPY OF MULTIPLE CORONARY ARTERIES USING LOW OSMOLAR CONTRAST: ICD-10-PCS | Performed by: INTERNAL MEDICINE

## 2020-03-08 PROCEDURE — 2709999900 HC NON-CHARGEABLE SUPPLY

## 2020-03-08 PROCEDURE — 82248 BILIRUBIN DIRECT: CPT

## 2020-03-08 PROCEDURE — 4A023N7 MEASUREMENT OF CARDIAC SAMPLING AND PRESSURE, LEFT HEART, PERCUTANEOUS APPROACH: ICD-10-PCS | Performed by: INTERNAL MEDICINE

## 2020-03-08 PROCEDURE — 93005 ELECTROCARDIOGRAM TRACING: CPT | Performed by: INTERNAL MEDICINE

## 2020-03-08 PROCEDURE — 6370000000 HC RX 637 (ALT 250 FOR IP): Performed by: INTERNAL MEDICINE

## 2020-03-08 PROCEDURE — 1200000003 HC TELEMETRY R&B

## 2020-03-08 PROCEDURE — 71046 X-RAY EXAM CHEST 2 VIEWS: CPT

## 2020-03-08 PROCEDURE — 80053 COMPREHEN METABOLIC PANEL: CPT

## 2020-03-08 PROCEDURE — 93005 ELECTROCARDIOGRAM TRACING: CPT | Performed by: EMERGENCY MEDICINE

## 2020-03-08 PROCEDURE — 6360000004 HC RX CONTRAST MEDICATION: Performed by: INTERNAL MEDICINE

## 2020-03-08 PROCEDURE — 93571 IV DOP VEL&/PRESS C FLO 1ST: CPT | Performed by: INTERNAL MEDICINE

## 2020-03-08 PROCEDURE — C1753 CATH, INTRAVAS ULTRASOUND: HCPCS

## 2020-03-08 PROCEDURE — 6360000002 HC RX W HCPCS

## 2020-03-08 PROCEDURE — 93010 ELECTROCARDIOGRAM REPORT: CPT | Performed by: NUCLEAR MEDICINE

## 2020-03-08 PROCEDURE — 99284 EMERGENCY DEPT VISIT MOD MDM: CPT

## 2020-03-08 PROCEDURE — 85379 FIBRIN DEGRADATION QUANT: CPT

## 2020-03-08 PROCEDURE — C1887 CATHETER, GUIDING: HCPCS

## 2020-03-08 PROCEDURE — 2580000003 HC RX 258: Performed by: INTERNAL MEDICINE

## 2020-03-08 PROCEDURE — 6360000002 HC RX W HCPCS: Performed by: INTERNAL MEDICINE

## 2020-03-08 PROCEDURE — B2151ZZ FLUOROSCOPY OF LEFT HEART USING LOW OSMOLAR CONTRAST: ICD-10-PCS | Performed by: INTERNAL MEDICINE

## 2020-03-08 PROCEDURE — 84484 ASSAY OF TROPONIN QUANT: CPT

## 2020-03-08 PROCEDURE — 36415 COLL VENOUS BLD VENIPUNCTURE: CPT

## 2020-03-08 PROCEDURE — 2500000003 HC RX 250 WO HCPCS

## 2020-03-08 PROCEDURE — C1894 INTRO/SHEATH, NON-LASER: HCPCS

## 2020-03-08 PROCEDURE — 83690 ASSAY OF LIPASE: CPT

## 2020-03-08 PROCEDURE — 93567 NJX CAR CTH SPRVLV AORTGRPHY: CPT | Performed by: INTERNAL MEDICINE

## 2020-03-08 PROCEDURE — 2500000003 HC RX 250 WO HCPCS: Performed by: EMERGENCY MEDICINE

## 2020-03-08 PROCEDURE — B240ZZ3 ULTRASONOGRAPHY OF SINGLE CORONARY ARTERY, INTRAVASCULAR: ICD-10-PCS | Performed by: INTERNAL MEDICINE

## 2020-03-08 PROCEDURE — 92978 ENDOLUMINL IVUS OCT C 1ST: CPT | Performed by: INTERNAL MEDICINE

## 2020-03-08 PROCEDURE — 71275 CT ANGIOGRAPHY CHEST: CPT

## 2020-03-08 RX ORDER — HEPARIN SODIUM 1000 [USP'U]/ML
4000 INJECTION, SOLUTION INTRAVENOUS; SUBCUTANEOUS ONCE
Status: COMPLETED | OUTPATIENT
Start: 2020-03-08 | End: 2020-03-08

## 2020-03-08 RX ORDER — CLONIDINE HYDROCHLORIDE 0.2 MG/1
0.2 TABLET ORAL 2 TIMES DAILY
Status: DISCONTINUED | OUTPATIENT
Start: 2020-03-08 | End: 2020-03-10 | Stop reason: HOSPADM

## 2020-03-08 RX ORDER — PANTOPRAZOLE SODIUM 40 MG/10ML
40 INJECTION, POWDER, LYOPHILIZED, FOR SOLUTION INTRAVENOUS ONCE
Status: COMPLETED | OUTPATIENT
Start: 2020-03-08 | End: 2020-03-08

## 2020-03-08 RX ORDER — SODIUM CHLORIDE 0.9 % (FLUSH) 0.9 %
10 SYRINGE (ML) INJECTION EVERY 12 HOURS SCHEDULED
Status: DISCONTINUED | OUTPATIENT
Start: 2020-03-08 | End: 2020-03-10 | Stop reason: HOSPADM

## 2020-03-08 RX ORDER — ALBUTEROL SULFATE 90 UG/1
2 AEROSOL, METERED RESPIRATORY (INHALATION) EVERY 4 HOURS PRN
Status: DISCONTINUED | OUTPATIENT
Start: 2020-03-08 | End: 2020-03-10 | Stop reason: HOSPADM

## 2020-03-08 RX ORDER — POTASSIUM CHLORIDE 7.45 MG/ML
10 INJECTION INTRAVENOUS PRN
Status: DISCONTINUED | OUTPATIENT
Start: 2020-03-08 | End: 2020-03-09 | Stop reason: ALTCHOICE

## 2020-03-08 RX ORDER — HYDRALAZINE HYDROCHLORIDE 20 MG/ML
10 INJECTION INTRAMUSCULAR; INTRAVENOUS EVERY 6 HOURS PRN
Status: DISCONTINUED | OUTPATIENT
Start: 2020-03-08 | End: 2020-03-10 | Stop reason: HOSPADM

## 2020-03-08 RX ORDER — CETIRIZINE HYDROCHLORIDE 10 MG/1
10 TABLET ORAL DAILY
Status: DISCONTINUED | OUTPATIENT
Start: 2020-03-08 | End: 2020-03-10 | Stop reason: HOSPADM

## 2020-03-08 RX ORDER — VITAMIN B COMPLEX
4000 TABLET ORAL DAILY
Status: DISCONTINUED | OUTPATIENT
Start: 2020-03-08 | End: 2020-03-10 | Stop reason: HOSPADM

## 2020-03-08 RX ORDER — ALPRAZOLAM 0.5 MG/1
0.5 TABLET ORAL ONCE
Status: COMPLETED | OUTPATIENT
Start: 2020-03-08 | End: 2020-03-08

## 2020-03-08 RX ORDER — SODIUM CHLORIDE 9 MG/ML
INJECTION, SOLUTION INTRAVENOUS CONTINUOUS
Status: DISCONTINUED | OUTPATIENT
Start: 2020-03-08 | End: 2020-03-09

## 2020-03-08 RX ORDER — HYDROCHLOROTHIAZIDE 25 MG/1
25 TABLET ORAL DAILY
Status: DISCONTINUED | OUTPATIENT
Start: 2020-03-08 | End: 2020-03-10 | Stop reason: HOSPADM

## 2020-03-08 RX ORDER — HYDRALAZINE HYDROCHLORIDE 20 MG/ML
10 INJECTION INTRAMUSCULAR; INTRAVENOUS ONCE
Status: COMPLETED | OUTPATIENT
Start: 2020-03-08 | End: 2020-03-08

## 2020-03-08 RX ORDER — METOPROLOL TARTRATE 50 MG/1
50 TABLET, FILM COATED ORAL 2 TIMES DAILY
Status: DISCONTINUED | OUTPATIENT
Start: 2020-03-08 | End: 2020-03-10 | Stop reason: HOSPADM

## 2020-03-08 RX ORDER — HEPARIN SODIUM 1000 [USP'U]/ML
2000 INJECTION, SOLUTION INTRAVENOUS; SUBCUTANEOUS PRN
Status: DISCONTINUED | OUTPATIENT
Start: 2020-03-08 | End: 2020-03-09

## 2020-03-08 RX ORDER — POLYETHYLENE GLYCOL 3350 17 G/17G
17 POWDER, FOR SOLUTION ORAL DAILY PRN
Status: DISCONTINUED | OUTPATIENT
Start: 2020-03-08 | End: 2020-03-10 | Stop reason: HOSPADM

## 2020-03-08 RX ORDER — SODIUM CHLORIDE 0.9 % (FLUSH) 0.9 %
10 SYRINGE (ML) INJECTION PRN
Status: DISCONTINUED | OUTPATIENT
Start: 2020-03-08 | End: 2020-03-10 | Stop reason: HOSPADM

## 2020-03-08 RX ORDER — ASPIRIN 81 MG/1
81 TABLET, CHEWABLE ORAL DAILY
COMMUNITY

## 2020-03-08 RX ORDER — ASPIRIN 81 MG/1
81 TABLET, CHEWABLE ORAL DAILY
Status: DISCONTINUED | OUTPATIENT
Start: 2020-03-09 | End: 2020-03-08 | Stop reason: SDUPTHER

## 2020-03-08 RX ORDER — POTASSIUM CHLORIDE 20 MEQ/1
40 TABLET, EXTENDED RELEASE ORAL PRN
Status: DISCONTINUED | OUTPATIENT
Start: 2020-03-08 | End: 2020-03-09 | Stop reason: ALTCHOICE

## 2020-03-08 RX ORDER — HEPARIN SODIUM 10000 [USP'U]/100ML
10.9 INJECTION, SOLUTION INTRAVENOUS CONTINUOUS
Status: DISCONTINUED | OUTPATIENT
Start: 2020-03-08 | End: 2020-03-09

## 2020-03-08 RX ORDER — ONDANSETRON 2 MG/ML
4 INJECTION INTRAMUSCULAR; INTRAVENOUS EVERY 6 HOURS PRN
Status: DISCONTINUED | OUTPATIENT
Start: 2020-03-08 | End: 2020-03-10 | Stop reason: HOSPADM

## 2020-03-08 RX ORDER — SPIRONOLACTONE 25 MG/1
50 TABLET ORAL DAILY
Status: DISCONTINUED | OUTPATIENT
Start: 2020-03-08 | End: 2020-03-10 | Stop reason: HOSPADM

## 2020-03-08 RX ORDER — ASPIRIN 81 MG/1
81 TABLET, CHEWABLE ORAL DAILY
Status: DISCONTINUED | OUTPATIENT
Start: 2020-03-08 | End: 2020-03-10 | Stop reason: HOSPADM

## 2020-03-08 RX ORDER — NITROGLYCERIN 0.4 MG/1
0.4 TABLET SUBLINGUAL EVERY 5 MIN PRN
Status: DISCONTINUED | OUTPATIENT
Start: 2020-03-08 | End: 2020-03-10 | Stop reason: HOSPADM

## 2020-03-08 RX ORDER — ACETAMINOPHEN 325 MG/1
650 TABLET ORAL EVERY 6 HOURS PRN
Status: DISCONTINUED | OUTPATIENT
Start: 2020-03-08 | End: 2020-03-10 | Stop reason: HOSPADM

## 2020-03-08 RX ORDER — ACETAMINOPHEN 650 MG/1
650 SUPPOSITORY RECTAL EVERY 6 HOURS PRN
Status: DISCONTINUED | OUTPATIENT
Start: 2020-03-08 | End: 2020-03-10 | Stop reason: HOSPADM

## 2020-03-08 RX ORDER — ATORVASTATIN CALCIUM 40 MG/1
40 TABLET, FILM COATED ORAL NIGHTLY
Status: DISCONTINUED | OUTPATIENT
Start: 2020-03-08 | End: 2020-03-10 | Stop reason: HOSPADM

## 2020-03-08 RX ORDER — NITROGLYCERIN 20 MG/100ML
INJECTION INTRAVENOUS
Status: DISPENSED
Start: 2020-03-08 | End: 2020-03-08

## 2020-03-08 RX ORDER — METOPROLOL TARTRATE 5 MG/5ML
5 INJECTION INTRAVENOUS ONCE
Status: DISCONTINUED | OUTPATIENT
Start: 2020-03-08 | End: 2020-03-10 | Stop reason: HOSPADM

## 2020-03-08 RX ORDER — AMLODIPINE BESYLATE 5 MG/1
5 TABLET ORAL DAILY
Status: DISCONTINUED | OUTPATIENT
Start: 2020-03-08 | End: 2020-03-10 | Stop reason: HOSPADM

## 2020-03-08 RX ORDER — NITROGLYCERIN 20 MG/100ML
5 INJECTION INTRAVENOUS CONTINUOUS
Status: DISCONTINUED | OUTPATIENT
Start: 2020-03-08 | End: 2020-03-10 | Stop reason: HOSPADM

## 2020-03-08 RX ORDER — MORPHINE SULFATE 2 MG/ML
2 INJECTION, SOLUTION INTRAMUSCULAR; INTRAVENOUS
Status: DISCONTINUED | OUTPATIENT
Start: 2020-03-08 | End: 2020-03-10 | Stop reason: HOSPADM

## 2020-03-08 RX ORDER — PROMETHAZINE HYDROCHLORIDE 25 MG/1
12.5 TABLET ORAL EVERY 6 HOURS PRN
Status: DISCONTINUED | OUTPATIENT
Start: 2020-03-08 | End: 2020-03-10 | Stop reason: HOSPADM

## 2020-03-08 RX ORDER — HEPARIN SODIUM 1000 [USP'U]/ML
4000 INJECTION, SOLUTION INTRAVENOUS; SUBCUTANEOUS PRN
Status: DISCONTINUED | OUTPATIENT
Start: 2020-03-08 | End: 2020-03-09

## 2020-03-08 RX ADMIN — ALPRAZOLAM 0.5 MG: 0.5 TABLET ORAL at 18:37

## 2020-03-08 RX ADMIN — NITROGLYCERIN 5 MCG/MIN: 20 INJECTION INTRAVENOUS at 07:53

## 2020-03-08 RX ADMIN — IOPAMIDOL 80 ML: 755 INJECTION, SOLUTION INTRAVENOUS at 15:41

## 2020-03-08 RX ADMIN — CLONIDINE HYDROCHLORIDE 0.2 MG: 0.2 TABLET ORAL at 12:02

## 2020-03-08 RX ADMIN — Medication 10 ML: at 12:29

## 2020-03-08 RX ADMIN — ATORVASTATIN CALCIUM 40 MG: 40 TABLET, FILM COATED ORAL at 21:41

## 2020-03-08 RX ADMIN — HEPARIN SODIUM 4000 UNITS: 1000 INJECTION INTRAVENOUS; SUBCUTANEOUS at 11:56

## 2020-03-08 RX ADMIN — ACETAMINOPHEN 650 MG: 325 TABLET ORAL at 21:41

## 2020-03-08 RX ADMIN — HEPARIN SODIUM 10.9 UNITS/KG/HR: 10000 INJECTION, SOLUTION INTRAVENOUS at 11:52

## 2020-03-08 RX ADMIN — IOPAMIDOL 200 ML: 755 INJECTION, SOLUTION INTRAVENOUS at 14:28

## 2020-03-08 RX ADMIN — CLONIDINE HYDROCHLORIDE 0.2 MG: 0.2 TABLET ORAL at 21:41

## 2020-03-08 RX ADMIN — METOPROLOL TARTRATE 50 MG: 50 TABLET, FILM COATED ORAL at 20:07

## 2020-03-08 RX ADMIN — HYDROCHLOROTHIAZIDE 25 MG: 25 TABLET ORAL at 16:37

## 2020-03-08 RX ADMIN — PANTOPRAZOLE SODIUM 40 MG: 40 INJECTION, POWDER, FOR SOLUTION INTRAVENOUS at 12:27

## 2020-03-08 RX ADMIN — HYDRALAZINE HYDROCHLORIDE 10 MG: 20 INJECTION INTRAMUSCULAR; INTRAVENOUS at 18:30

## 2020-03-08 RX ADMIN — HYDRALAZINE HYDROCHLORIDE 10 MG: 20 INJECTION INTRAMUSCULAR; INTRAVENOUS at 12:36

## 2020-03-08 RX ADMIN — SODIUM CHLORIDE: 9 INJECTION, SOLUTION INTRAVENOUS at 13:00

## 2020-03-08 RX ADMIN — ONDANSETRON 4 MG: 2 INJECTION INTRAMUSCULAR; INTRAVENOUS at 21:06

## 2020-03-08 RX ADMIN — AMLODIPINE BESYLATE 5 MG: 5 TABLET ORAL at 20:07

## 2020-03-08 RX ADMIN — ONDANSETRON 4 MG: 2 INJECTION INTRAMUSCULAR; INTRAVENOUS at 10:00

## 2020-03-08 ASSESSMENT — ENCOUNTER SYMPTOMS
EYE REDNESS: 0
NAUSEA: 0
SHORTNESS OF BREATH: 0
ABDOMINAL PAIN: 0
CONSTIPATION: 0
PHOTOPHOBIA: 0
EYE ITCHING: 0
RHINORRHEA: 0
CHEST TIGHTNESS: 0
DIARRHEA: 0
BACK PAIN: 0
STRIDOR: 0
EYE PAIN: 0
SORE THROAT: 0
EYE DISCHARGE: 0
ABDOMINAL DISTENTION: 0
COUGH: 0
WHEEZING: 0
VOMITING: 0

## 2020-03-08 ASSESSMENT — PAIN DESCRIPTION - PROGRESSION

## 2020-03-08 ASSESSMENT — PAIN SCALES - GENERAL
PAINLEVEL_OUTOF10: 7
PAINLEVEL_OUTOF10: 1
PAINLEVEL_OUTOF10: 5
PAINLEVEL_OUTOF10: 0

## 2020-03-08 ASSESSMENT — PAIN DESCRIPTION - DESCRIPTORS
DESCRIPTORS: ACHING;PRESSURE
DESCRIPTORS: ACHING

## 2020-03-08 ASSESSMENT — PAIN DESCRIPTION - FREQUENCY
FREQUENCY: CONTINUOUS
FREQUENCY: CONTINUOUS

## 2020-03-08 ASSESSMENT — PAIN DESCRIPTION - ONSET: ONSET: ON-GOING

## 2020-03-08 ASSESSMENT — PAIN DESCRIPTION - LOCATION
LOCATION: CHEST
LOCATION: CHEST

## 2020-03-08 ASSESSMENT — PAIN DESCRIPTION - ORIENTATION: ORIENTATION: LEFT;LOWER

## 2020-03-08 ASSESSMENT — PAIN DESCRIPTION - PAIN TYPE
TYPE: ACUTE PAIN
TYPE: ACUTE PAIN

## 2020-03-08 ASSESSMENT — PAIN - FUNCTIONAL ASSESSMENT: PAIN_FUNCTIONAL_ASSESSMENT: PREVENTS OR INTERFERES SOME ACTIVE ACTIVITIES AND ADLS

## 2020-03-08 NOTE — PROGRESS NOTES
Pt admitted to  [phone] in a wheelchair. Complaints: Chest pain / discomfort. IV Nitro infusing into the forearm left, condition patent and no redness at a rate of 4.5 mls/ hour with about 200 mls in the bag still. IV site free of s/s of infection or infiltration. Vital signs obtained. Assessment and data collection initiated. Two nurse skin assessment performed by Rajeev Swan RN and Ashlee Alvarez. Oriented to room. Policies and procedures for 3B explained. Kimberli MOSELEY discussed hourly rounding with patient addressing 5 P's. Fall prevention and safety brochure discussed with patient. Bed alarm on. Call light in reach. The best day to schedule a follow up Dr appointment is:  Friday a.m. Explained patients right to have family, representative or physician notified of their admission. Patient has Requested for physician to be notified. Patient has Declined for family/representative to be notified. He will call them himself per cellphone. All questions answered with no further questions at this time.

## 2020-03-08 NOTE — ED NOTES
Patient to ED via EMS for chest pain. Patient states chest pain started yesterday. Throughout the night pain worsened. In route to ER patient was given 4 baby asa with 1 nitro. Patient now having no to little pain.  Patient reports feeling sick last night but did not vomit     Nahun Chi RN  03/08/20 2782

## 2020-03-08 NOTE — FLOWSHEET NOTE
.Advance Directive Consult: Advance Directive materials were provided to patient and explained. Patient is not ready to complete at this time, gave information for Spiritual Care to be contacted if further assistance is needed. 03/08/20 1620   Encounter Summary   Services provided to: Patient and family together   Referral/Consult From: Nurse   Support System Spouse; Children;Family members   Continue Visiting Yes  (3/8)   Complexity of Encounter Low   Length of Encounter 15 minutes   Spiritual/Anabaptist   Type Spiritual support   Advance Directives (For Healthcare)   Pre-existing DNR Comfort Care/DNR Arrest/DNI Order No   Healthcare Directive No, patient does not have an advance directive for healthcare treatment   Advance Directives Documents given;Documents explained;Pt. not interested at this time

## 2020-03-08 NOTE — ED PROVIDER NOTES
Allergic/Immunologic: Negative for environmental allergies and food allergies. Neurological: Negative for dizziness, tremors, syncope, weakness and headaches. Psychiatric/Behavioral: Negative for agitation, behavioral problems, confusion, self-injury, sleep disturbance and suicidal ideas. PAST MEDICAL HISTORY    has a past medical history of Hypertension. SURGICAL HISTORY      has a past surgical history that includes Mouth surgery; hernia repair (12); and hernia repair (-). CURRENT MEDICATIONS       Previous Medications    ALBUTEROL SULFATE  (90 BASE) MCG/ACT INHALER    INHALE 1 TO 2 PUFFS BY MOUTH EVERY 4 HOURS AS NEEDED FOR WHEEZING OR  SHORTNESS  OF  BREATH    CHOLECALCIFEROL (VITAMIN D) 2000 UNITS CAPS CAPSULE    Take 4,000 Units by mouth daily    CLONIDINE (CATAPRES) 0.2 MG TABLET    TAKE ONE TABLET BY MOUTH TWICE DAILY    COENZYME Q10 (CO Q 10 PO)    Take by mouth daily    HYDROCHLOROTHIAZIDE (HYDRODIURIL) 25 MG TABLET    Take 1 tablet by mouth daily    LORATADINE (CLARITIN) 10 MG CAPSULE    Take 10 mg by mouth daily    MILK THISTLE PO    Take 1 tablet by mouth daily    MISC NATURAL PRODUCTS (GLUCOSAMINE CHOND COMPLEX/MSM PO)    Take by mouth daily    MULTIPLE VITAMINS-MINERALS (MULTIVITAMIN PO)    Take by mouth daily    OMEGA-3 FATTY ACIDS (FISH OIL PO)    Take by mouth daily    SAW PALMETTO 450 MG CAPS    Take by mouth 2 times daily       ALLERGIES     is allergic to ace inhibitors; aleve [naproxen sodium]; and percocet [oxycodone-acetaminophen]. FAMILY HISTORY     He indicated that his mother is . He indicated that his father is . He indicated that only one of his two sisters is alive. He indicated that his brother is alive. He indicated that his maternal grandmother is . He indicated that his maternal grandfather is . He indicated that his paternal grandmother is . He indicated that his paternal grandfather is . Findings: No erythema or rash. Neurological:      Mental Status: He is alert and oriented to person, place, and time. Cranial Nerves: No cranial nerve deficit. Sensory: No sensory deficit. Motor: No abnormal muscle tone. Coordination: Coordination normal.      Deep Tendon Reflexes: Reflexes normal.   Psychiatric:         Behavior: Behavior normal.         Thought Content: Thought content normal.         Judgment: Judgment normal.           DIFFERENTIAL DIAGNOSIS:   Bronchitis, Pneumonia, ACS, PE, Musculoskeletal pain, pneumothorax, pleuritic pain    DIAGNOSTIC RESULTS     EKG: All EKG's are interpreted by the Emergency Department Physician who either signs or Co-signsthis chart in the absence of a cardiologist.  Interpreted by me  Normal sinus rhythm  Ventricular rate 96 bpm  GA interval 154 ms  QRS duration 106 ms   ms      RADIOLOGY: non-plain film images(s) such as CT, Ultrasound and MRI are read by the radiologist.    XR CHEST STANDARD (2 VW)   Final Result      No acute cardiopulmonary disease. **This report has been created using voice recognition software. It may contain minor errors which are inherent in voice recognition technology. **      Final report electronically signed by Dr. Daylin Diaz on 3/8/2020 6:46 AM          []Visualized and interpreted by me   [] Radiologist's Wet Read Report Reviewed   [] Discussed with Radiologist.    Monique Briggs:   Results for orders placed or performed during the hospital encounter of 15/65/44   Basic Metabolic Panel   Result Value Ref Range    Sodium 139 135 - 145 meq/L    Potassium 3.6 3.5 - 5.2 meq/L    Chloride 101 98 - 111 meq/L    CO2 24 23 - 33 meq/L    Glucose 127 (H) 70 - 108 mg/dL    BUN 20 7 - 22 mg/dL    CREATININE 0.8 0.4 - 1.2 mg/dL    Calcium 9.6 8.5 - 10.5 mg/dL   CBC auto differential   Result Value Ref Range    WBC 7.5 4.8 - 10.8 thou/mm3    RBC 5.25 4.70 - 6.10 mill/mm3    Hemoglobin 16.2 14.0 - 18.0 gm/dl    Hematocrit 47.8 42.0 - 52.0 %    MCV 91.0 80.0 - 94.0 fL    MCH 30.9 26.0 - 33.0 pg    MCHC 33.9 32.2 - 35.5 gm/dl    RDW-CV 12.5 11.5 - 14.5 %    RDW-SD 41.4 35.0 - 45.0 fL    Platelets 667 317 - 853 thou/mm3    MPV 11.2 9.4 - 12.4 fL    Seg Neutrophils 73.0 %    Lymphocytes 13.8 %    Monocytes 11.1 %    Eosinophils 1.5 %    Basophils 0.5 %    Immature Granulocytes 0.1 %    Segs Absolute 5.5 1.8 - 7.7 thou/mm3    Lymphocytes Absolute 1.0 1.0 - 4.8 thou/mm3    Monocytes Absolute 0.8 0.4 - 1.3 thou/mm3    Eosinophils Absolute 0.1 0.0 - 0.4 thou/mm3    Basophils Absolute 0.0 0.0 - 0.1 thou/mm3    Immature Grans (Abs) 0.01 0.00 - 0.07 thou/mm3    nRBC 0 /100 wbc   Troponin   Result Value Ref Range    Troponin T < 0.010 ng/ml   Hepatic function panel   Result Value Ref Range    Alb 4.2 3.5 - 5.1 g/dL    Total Bilirubin 0.5 0.3 - 1.2 mg/dL    Bilirubin, Direct <0.2 0.0 - 0.3 mg/dL    Alkaline Phosphatase 71 38 - 126 U/L    AST 21 5 - 40 U/L    ALT 27 11 - 66 U/L    Total Protein 7.1 6.1 - 8.0 g/dL   Lipase   Result Value Ref Range    Lipase 28.2 5.6 - 51.3 U/L   Anion Gap   Result Value Ref Range    Anion Gap 14.0 8.0 - 16.0 meq/L   Glomerular Filtration Rate, Estimated   Result Value Ref Range    Est, Glom Filt Rate >90 ml/min/1.73m2   Osmolality   Result Value Ref Range    Osmolality Calc 281.7 275.0 - 300 mOsmol/kg   EKG Chest Pain   Result Value Ref Range    Ventricular Rate 96 BPM    Atrial Rate 96 BPM    P-R Interval 154 ms    QRS Duration 106 ms    Q-T Interval 372 ms    QTc Calculation (Bazett) 469 ms    P Axis 60 degrees    R Axis -58 degrees    T Axis 33 degrees       EMERGENCY DEPARTMENT COURSE:   Vitals:    Vitals:    03/08/20 0536 03/08/20 0637 03/08/20 0739   BP: (!) 185/97 (!) 179/101 (!) 186/98   Pulse: 86 88 75   Resp: 20 20 18   Temp: 98 °F (36.7 °C)     TempSrc: Oral     SpO2: 98% 98% 98%   Weight: 197 lb (89.4 kg)     Height: 5' 8\" (1.727 m)         5:50 AM    Patient is seen and evaluated in a timely fashion. He has ASA and Nitro already by EMS and pain is gone now      [address]    ED cardiac work-ups are negative. Normal troponin. EKG is nonischemic. Patient's chest pain is high risk for cardiac etiology and admissions of warranted. Discussed and admitted by Dr. Khari Dyer. Addendum: he is already admitted but while waiting for transport to floor, he developed chest pain again, Nitro drip is started. CRITICAL CARE:   None    CONSULTS:  Dr. Lemus Sick:  None    FINAL IMPRESSION      1. Chest pain with high risk for cardiac etiology    2.  Primary hypertension          DISPOSITION/PLAN   Admit    PATIENT REFERRED TO:  Mariah Hammond MD  [facility], [address] [facility] [address]            DISCHARGE MEDICATIONS:  New Prescriptions    No medications on file       (Please note that portions of this note were completed with a voice recognition program.  Efforts were made to edit the dictations but occasionally words aremis-transcribed.)    MD Ashley Kuo MD  03/08/20 5694       Ashley Samaniego MD  03/08/20 2012

## 2020-03-08 NOTE — H&P
has no carotid bruit. There was no  thyromegaly. Has no neck lymphadenopathy. HEENT:  No discharge from the throat, ears, or nose. LUNGS:  Showed scattered rhonchi. Clear. HEART:  No S3.  ABDOMEN:  Soft. GENITAL/RECTAL:  Deferred. EXTREMITIES:  Lower limbs, there is no edema. DIAGNOSTIC DATA:  EKG showed sinus rhythm, no acute changes. Troponins  are negative. IMPRESSION:  1. The patient with possible unstable angina. The patient would  continue with IV nitroglycerin and serial cardiac enzymes will be  obtained. 2.  Hypertensive cardiovascular disease. The patient's blood pressure  will be controlled. 3.  ACE inhibitors. The patient did receive multiple doses of morphine, increase the  nitroglycerin. He continues to have the chest pain and the patient  continued to be symptomatic, would also control the blood pressure. Arrangements will be made for the patient to have a heart cath, possible  intervention. The patient understands the procedure, benefits, risks,  alternative methods of treatment, and possible complications and wants  it to be done. Further recommendation pending the results of the above tests. Jose Palma M.D.    D: 03/08/2020 13:06:49       T: 03/08/2020 13:56:59     [email or web address]  Job#: [id number]     Doc#: [id number]    CC:  Za Hernandez M.D.

## 2020-03-08 NOTE — ED NOTES
Called [facility] and and informed Kiana Child them that the patient is on their way to the unit.       Sukhwinder Burnett  03/08/20 1111

## 2020-03-09 ENCOUNTER — APPOINTMENT (OUTPATIENT)
Dept: CT IMAGING | Age: 70
DRG: 287 | End: 2020-03-09
Payer: MEDICARE

## 2020-03-09 ENCOUNTER — APPOINTMENT (OUTPATIENT)
Dept: MRI IMAGING | Age: 70
DRG: 287 | End: 2020-03-09
Payer: MEDICARE

## 2020-03-09 LAB
AFP-TUMOR MARKER: 1.5 UG/L
ALBUMIN SERPL-MCNC: 4 G/DL (ref 3.5–5.1)
ALP BLD-CCNC: 66 U/L (ref 38–126)
ALT SERPL-CCNC: 25 U/L (ref 11–66)
AST SERPL-CCNC: 25 U/L (ref 5–40)
BILIRUB SERPL-MCNC: 0.4 MG/DL (ref 0.3–1.2)
BILIRUBIN DIRECT: < 0.2 MG/DL (ref 0–0.3)
CEA: 1 NG/ML (ref 0–5)
CHOLESTEROL, TOTAL: 169 MG/DL (ref 100–199)
EKG ATRIAL RATE: 93 BPM
EKG ATRIAL RATE: 93 BPM
EKG P AXIS: 68 DEGREES
EKG P AXIS: 68 DEGREES
EKG P-R INTERVAL: 180 MS
EKG P-R INTERVAL: 180 MS
EKG Q-T INTERVAL: 378 MS
EKG Q-T INTERVAL: 378 MS
EKG QRS DURATION: 116 MS
EKG QRS DURATION: 116 MS
EKG QTC CALCULATION (BAZETT): 469 MS
EKG QTC CALCULATION (BAZETT): 469 MS
EKG R AXIS: -41 DEGREES
EKG R AXIS: -41 DEGREES
EKG T AXIS: 34 DEGREES
EKG T AXIS: 34 DEGREES
EKG VENTRICULAR RATE: 93 BPM
EKG VENTRICULAR RATE: 93 BPM
ERYTHROCYTE [DISTWIDTH] IN BLOOD BY AUTOMATED COUNT: 13.1 % (ref 11.5–14.5)
ERYTHROCYTE [DISTWIDTH] IN BLOOD BY AUTOMATED COUNT: 42.9 FL (ref 35–45)
HCT VFR BLD CALC: 46 % (ref 42–52)
HDLC SERPL-MCNC: 58 MG/DL
HEMOGLOBIN: 15.5 GM/DL (ref 14–18)
LD: 224 U/L (ref 100–190)
LDL CHOLESTEROL CALCULATED: 96 MG/DL
MCH RBC QN AUTO: 30.6 PG (ref 26–33)
MCHC RBC AUTO-ENTMCNC: 33.7 GM/DL (ref 32.2–35.5)
MCV RBC AUTO: 90.9 FL (ref 80–94)
PLATELET # BLD: 209 THOU/MM3 (ref 130–400)
PMV BLD AUTO: 11 FL (ref 9.4–12.4)
POTASSIUM SERPL-SCNC: 3.1 MEQ/L (ref 3.5–5.2)
RBC # BLD: 5.06 MILL/MM3 (ref 4.7–6.1)
TOTAL PROTEIN: 7.2 G/DL (ref 6.1–8)
TRIGL SERPL-MCNC: 75 MG/DL (ref 0–199)
WBC # BLD: 10.6 THOU/MM3 (ref 4.8–10.8)

## 2020-03-09 PROCEDURE — 6360000004 HC RX CONTRAST MEDICATION: Performed by: INTERNAL MEDICINE

## 2020-03-09 PROCEDURE — 83615 LACTATE (LD) (LDH) ENZYME: CPT

## 2020-03-09 PROCEDURE — 72157 MRI CHEST SPINE W/O & W/DYE: CPT

## 2020-03-09 PROCEDURE — 99223 1ST HOSP IP/OBS HIGH 75: CPT | Performed by: INTERNAL MEDICINE

## 2020-03-09 PROCEDURE — 74177 CT ABD & PELVIS W/CONTRAST: CPT

## 2020-03-09 PROCEDURE — 99223 1ST HOSP IP/OBS HIGH 75: CPT | Performed by: THORACIC SURGERY (CARDIOTHORACIC VASCULAR SURGERY)

## 2020-03-09 PROCEDURE — 2580000003 HC RX 258: Performed by: INTERNAL MEDICINE

## 2020-03-09 PROCEDURE — 82105 ALPHA-FETOPROTEIN SERUM: CPT

## 2020-03-09 PROCEDURE — 6360000002 HC RX W HCPCS: Performed by: INTERNAL MEDICINE

## 2020-03-09 PROCEDURE — 6370000000 HC RX 637 (ALT 250 FOR IP): Performed by: THORACIC SURGERY (CARDIOTHORACIC VASCULAR SURGERY)

## 2020-03-09 PROCEDURE — 80076 HEPATIC FUNCTION PANEL: CPT

## 2020-03-09 PROCEDURE — 84132 ASSAY OF SERUM POTASSIUM: CPT

## 2020-03-09 PROCEDURE — 36415 COLL VENOUS BLD VENIPUNCTURE: CPT

## 2020-03-09 PROCEDURE — 80061 LIPID PANEL: CPT

## 2020-03-09 PROCEDURE — 6370000000 HC RX 637 (ALT 250 FOR IP): Performed by: INTERNAL MEDICINE

## 2020-03-09 PROCEDURE — 85027 COMPLETE CBC AUTOMATED: CPT

## 2020-03-09 PROCEDURE — 82378 CARCINOEMBRYONIC ANTIGEN: CPT

## 2020-03-09 PROCEDURE — A9579 GAD-BASE MR CONTRAST NOS,1ML: HCPCS | Performed by: INTERNAL MEDICINE

## 2020-03-09 PROCEDURE — 93005 ELECTROCARDIOGRAM TRACING: CPT | Performed by: INTERNAL MEDICINE

## 2020-03-09 PROCEDURE — 93010 ELECTROCARDIOGRAM REPORT: CPT | Performed by: NUCLEAR MEDICINE

## 2020-03-09 PROCEDURE — 1200000003 HC TELEMETRY R&B

## 2020-03-09 PROCEDURE — 84702 CHORIONIC GONADOTROPIN TEST: CPT

## 2020-03-09 RX ORDER — POTASSIUM CHLORIDE 7.45 MG/ML
10 INJECTION INTRAVENOUS PRN
Status: DISCONTINUED | OUTPATIENT
Start: 2020-03-09 | End: 2020-03-10 | Stop reason: HOSPADM

## 2020-03-09 RX ORDER — MORPHINE SULFATE 2 MG/ML
2 INJECTION, SOLUTION INTRAMUSCULAR; INTRAVENOUS
Status: DISCONTINUED | OUTPATIENT
Start: 2020-03-09 | End: 2020-03-09

## 2020-03-09 RX ORDER — TRAMADOL HYDROCHLORIDE 50 MG/1
50 TABLET ORAL EVERY 4 HOURS PRN
Status: DISCONTINUED | OUTPATIENT
Start: 2020-03-09 | End: 2020-03-10 | Stop reason: HOSPADM

## 2020-03-09 RX ORDER — POTASSIUM CHLORIDE 20 MEQ/1
40 TABLET, EXTENDED RELEASE ORAL PRN
Status: DISCONTINUED | OUTPATIENT
Start: 2020-03-09 | End: 2020-03-10 | Stop reason: HOSPADM

## 2020-03-09 RX ADMIN — CLONIDINE HYDROCHLORIDE 0.2 MG: 0.2 TABLET ORAL at 20:01

## 2020-03-09 RX ADMIN — Medication 10 ML: at 20:00

## 2020-03-09 RX ADMIN — IOPAMIDOL 80 ML: 755 INJECTION, SOLUTION INTRAVENOUS at 15:41

## 2020-03-09 RX ADMIN — IOHEXOL 50 ML: 240 INJECTION, SOLUTION INTRATHECAL; INTRAVASCULAR; INTRAVENOUS; ORAL at 15:41

## 2020-03-09 RX ADMIN — SPIRONOLACTONE 50 MG: 25 TABLET ORAL at 08:56

## 2020-03-09 RX ADMIN — METOPROLOL TARTRATE 50 MG: 50 TABLET, FILM COATED ORAL at 20:01

## 2020-03-09 RX ADMIN — AMLODIPINE BESYLATE 5 MG: 5 TABLET ORAL at 12:12

## 2020-03-09 RX ADMIN — TRAMADOL HYDROCHLORIDE 50 MG: 50 TABLET, FILM COATED ORAL at 14:28

## 2020-03-09 RX ADMIN — ONDANSETRON 4 MG: 2 INJECTION INTRAMUSCULAR; INTRAVENOUS at 04:39

## 2020-03-09 RX ADMIN — VITAMIN D, TAB 1000IU (100/BT) 4000 UNITS: 25 TAB at 08:56

## 2020-03-09 RX ADMIN — METOPROLOL TARTRATE 50 MG: 50 TABLET, FILM COATED ORAL at 08:55

## 2020-03-09 RX ADMIN — SPIRONOLACTONE 50 MG: 25 TABLET ORAL at 00:43

## 2020-03-09 RX ADMIN — POTASSIUM CHLORIDE 40 MEQ: 1500 TABLET, EXTENDED RELEASE ORAL at 17:21

## 2020-03-09 RX ADMIN — MORPHINE SULFATE 2 MG: 2 INJECTION, SOLUTION INTRAMUSCULAR; INTRAVENOUS at 04:52

## 2020-03-09 RX ADMIN — ASPIRIN 81 MG 81 MG: 81 TABLET ORAL at 08:54

## 2020-03-09 RX ADMIN — MORPHINE SULFATE 2 MG: 2 INJECTION, SOLUTION INTRAMUSCULAR; INTRAVENOUS at 06:54

## 2020-03-09 RX ADMIN — TRAMADOL HYDROCHLORIDE 50 MG: 50 TABLET, FILM COATED ORAL at 21:05

## 2020-03-09 RX ADMIN — TRAMADOL HYDROCHLORIDE 50 MG: 50 TABLET, FILM COATED ORAL at 10:19

## 2020-03-09 RX ADMIN — HYDRALAZINE HYDROCHLORIDE 10 MG: 20 INJECTION INTRAMUSCULAR; INTRAVENOUS at 06:10

## 2020-03-09 RX ADMIN — HYDROCHLOROTHIAZIDE 25 MG: 25 TABLET ORAL at 08:55

## 2020-03-09 RX ADMIN — ATORVASTATIN CALCIUM 40 MG: 40 TABLET, FILM COATED ORAL at 20:05

## 2020-03-09 RX ADMIN — CLONIDINE HYDROCHLORIDE 0.2 MG: 0.2 TABLET ORAL at 08:54

## 2020-03-09 RX ADMIN — GADOTERIDOL 20 ML: 279.3 INJECTION, SOLUTION INTRAVENOUS at 16:35

## 2020-03-09 RX ADMIN — ONDANSETRON 4 MG: 2 INJECTION INTRAMUSCULAR; INTRAVENOUS at 19:59

## 2020-03-09 ASSESSMENT — PAIN DESCRIPTION - PROGRESSION
CLINICAL_PROGRESSION: NOT CHANGED

## 2020-03-09 ASSESSMENT — PAIN DESCRIPTION - ONSET: ONSET: ON-GOING

## 2020-03-09 ASSESSMENT — PAIN SCALES - GENERAL
PAINLEVEL_OUTOF10: 2
PAINLEVEL_OUTOF10: 0
PAINLEVEL_OUTOF10: 4
PAINLEVEL_OUTOF10: 10
PAINLEVEL_OUTOF10: 2
PAINLEVEL_OUTOF10: 10
PAINLEVEL_OUTOF10: 2

## 2020-03-09 ASSESSMENT — PAIN DESCRIPTION - FREQUENCY: FREQUENCY: CONTINUOUS

## 2020-03-09 ASSESSMENT — PAIN DESCRIPTION - LOCATION: LOCATION: BACK

## 2020-03-09 ASSESSMENT — PAIN DESCRIPTION - DIRECTION: RADIATING_TOWARDS: LOWER BACK AREA

## 2020-03-09 ASSESSMENT — PAIN DESCRIPTION - ORIENTATION: ORIENTATION: LOWER

## 2020-03-09 ASSESSMENT — PAIN DESCRIPTION - DESCRIPTORS: DESCRIPTORS: CONSTANT

## 2020-03-09 ASSESSMENT — PAIN DESCRIPTION - PAIN TYPE: TYPE: ACUTE PAIN

## 2020-03-09 NOTE — PROGRESS NOTES
0900:  Dr. Shonda Ng was on the unit and stated Dr. Makayla Godfrey called him and asked him to see this patient. I called Dr. Ata Cote and to let him know this and also to see if we can wean off the nitro drip and discontinue the heparin and normal saline from the [facility]     0925:   Gail called me back for Dr. Ata Cote and stated he is ok with the heparin and normal saline discontinued off the mar and he is ok with tramadol for this patient. She made him aware that Dr. Shonda Ng is on the case per Dr. Yue Maldonado calling Dr. Shonda Ng to see the patient. He also stated to wean the nitro to off.     1430:  1st dose of oral ct given to the patient. We used 8 oz of water and 12.5 ml of contrast.     1450:  2nd dose of oral ct given to the patient. We used 8 oz of water and 12.5 ml of contrast. Patient is aware to drink this in the next 15 min    1510:  3rd dose of oral ct given to the patient. We used 8 oz of water and 12.5 ml of contrast. Patient is aware to drink this in the next 15 min    1530:  4th dose of oral ct given to the patient.   We used 8 oz of water and 12.5 ml of contrast. Patient is aware to drink this in the next 15 min

## 2020-03-09 NOTE — CONSULTS
contrast with multiplanar    reconstructions   All CT scans at this facility use dose modulation, iterative reconstruction, and/or weight-based dosing when appropriate to reduce radiation dose to as low as reasonably achievable.       FINDINGS: Insufficient opacification of the pulmonary arteries to exclude distal emboli but no central or proximal branch emboli are seen. There is a 3.3 x 2.9 x 5.0 cm lobulated heterogeneously enhancing mass in anterior mediastinum. Most likely differential is that of thymoma or lymphoma. Pseudoaneurysm is not entirely excluded but considered unlikely. This is most contiguous with the    proximal pulmonary outflow tract   There are no infiltrates or effusions dependent atelectasis is present. There is no mediastinal, hilar, or axillary lymphadenopathy other than the previously described mass           Impression   1. No evidence of dissection   2. 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 Intake/Output Summary (Last 24 hours) at 3/9/2020 0848  Last data filed at 3/9/2020 0309  Gross per 24 hour   Intake 1422.61 ml   Output 100 ml   Net 1322.61 ml       Scheduled Meds:    metoprolol  5 mg Intravenous Once    sodium chloride flush  10 mL Intravenous 2 times per day    atorvastatin  40 mg Oral Nightly    aspirin  81 mg Oral Daily    Vitamin D  4,000 Units Oral Daily    cloNIDine  0.2 mg Oral BID    hydroCHLOROthiazide  25 mg Oral Daily    cetirizine  10 mg Oral Daily    metoprolol tartrate  50 mg Oral BID    amLODIPine  5 mg Oral Daily    spironolactone  50 mg Oral Daily         PastMedical History:  [facility]  has a past medical history of Hypertension. Past Surgical History:  The patient  has a past surgical history that includes Mouth surgery; hernia repair (5/30/12); and hernia repair (2-4-14). Left orchiectomy    Allergies:   The patient is allergic to ace inhibitors; aleve [naproxen sodium]; and percocet [oxycodone-acetaminophen]. Family History: This patient's family history includes Alzheimer's Disease in his mother; Cancer in his father and sister; High Blood Pressure in his mother. Social History:  Katie Rucker  reports that he has never smoked. He has never used smokeless tobacco. He reports that he does not drink alcohol or use drugs. ROS:  Constitutional: Negative for activity change, chills, fatigue, fever and unexpected weight change. HENT: Negative for congestion, facial swelling, sore throat, and changes in voice. Eyes: Negative for photophobia, redness, itching and visual disturbance. Respiratory: Negative for apnea, choking, shortness of breath, wheezing and stridor. Cardiovascular: Negative for chest pain, palpitations and leg swelling. Gastrointestinal: Negative for abdominal distention, constipation, nausea and vomiting. Endocrine: Negative for cold intolerance, heat intolerance, polyphagia and polyuria. Musculoskeletal: Low back pain  Skin: Negative for color change, rash and wound. Allergic/Immunologic: Negative for food allergies and immunocompromised state. Neurological: Negative for dizziness, tremors, speech difficulty, weakness, numbness and headaches. Hematological: Negative for adenopathy. Does not bruise/bleed easily. Psychiatric/Behavioral: Negative for agitation, confusion, and dysphoric mood. Physical Exam:   General appearance:  No apparent distress, appears stated age and cooperative. HEENT:  Normal cephalic, atraumatic without obvious deformity. Conjunctivae/corneas clear. Neck: Supple, with full range of motion. No jugular venous distention. Trachea midline. Respiratory:  Normal respiratory effort. Clear to auscultation, bilaterally without rales/wheezes/rhonchi. Cardiovascular:  Regular rate and rhythm with normal S1/S2 without murmurs, rubs or gallops. Abdomen: Soft, non-tender, non-distended with normal bowel sounds.   Musculoskeletal:  No

## 2020-03-09 NOTE — CONSULTS
pulmonologist in the past:No  Prior history of lung nodule/Lung mass:No  He was ever diagnosed with lung cancer:No    History of tobacco smoking:No    He denies cough, hemoptysis or expectoration. He admits to loss of weight with decreased appetite changes. He denies connective tissue diseases or Vasculitis. History of pulmonary tuberculosis in the past: No  Recent exposure to any patients with tuberculosis:No  Recent travel to endemic places of tuberculosis: No    He is currently not using any oxygen supplementation at rest, exercise or during sleep/at night time. No recent hospitalizations or emergency room visits. He denies any lung cancer in first-degree relative; exposure to asbestos, radon, or uranium.       PMHx   Past Medical History      Diagnosis Date    Hypertension       Past Surgical History        Procedure Laterality Date    HERNIA REPAIR  5/30/12    [facility] Dr. Talia Bullock  2-4-14    Repair recurrent incarcerated left inguinal hernia with mesh    MOUTH SURGERY      several years ago-removed 2-3 back teeth-wisdom teeth     Meds    Current Medications    metoprolol  5 mg Intravenous Once    sodium chloride flush  10 mL Intravenous 2 times per day    atorvastatin  40 mg Oral Nightly    aspirin  81 mg Oral Daily    Vitamin D  4,000 Units Oral Daily    cloNIDine  0.2 mg Oral BID    hydroCHLOROthiazide  25 mg Oral Daily    cetirizine  10 mg Oral Daily    metoprolol tartrate  50 mg Oral BID    amLODIPine  5 mg Oral Daily    spironolactone  50 mg Oral Daily     sodium chloride flush, potassium chloride **OR** potassium alternative oral replacement **OR** potassium chloride, acetaminophen **OR** acetaminophen, polyethylene glycol, promethazine **OR** ondansetron, nitroGLYCERIN, albuterol sulfate HFA, heparin (porcine), heparin (porcine), morphine, hydrALAZINE  IV Drips/Infusions   nitroGLYCERIN 30 mcg/min (03/09/20 0645)    heparin (porcine) 10.9 Units/kg/hr (03/08/20 1152)  sodium chloride Stopped (03/08/20 2009)     Home Medications  Medications Prior to Admission: aspirin 81 MG chewable tablet, Take 81 mg by mouth daily  loratadine (CLARITIN) 10 MG capsule, Take 10 mg by mouth daily  cloNIDine (CATAPRES) 0.2 MG tablet, TAKE ONE TABLET BY MOUTH TWICE DAILY  hydrochlorothiazide (HYDRODIURIL) 25 MG tablet, Take 1 tablet by mouth daily  Misc Natural Products (GLUCOSAMINE CHOND COMPLEX/MSM PO), Take by mouth daily  Omega-3 Fatty Acids (FISH OIL PO), Take by mouth daily  Multiple Vitamins-Minerals (MULTIVITAMIN PO), Take by mouth daily  Coenzyme Q10 (CO Q 10 PO), Take by mouth daily  S[place] 450 MG CAPS, Take by mouth 2 times daily  Cholecalciferol (VITAMIN D) 2000 units CAPS capsule, Take 4,000 Units by mouth daily  albuterol sulfate  (90 Base) MCG/ACT inhaler, INHALE 1 TO 2 PUFFS BY MOUTH EVERY 4 HOURS AS NEEDED FOR WHEEZING OR  SHORTNESS  OF  BREATH  MILK THISTLE PO, Take 1 tablet by mouth daily  Diet    DIET CARDIAC; No Caffeine  Allergies    Ace inhibitors;  Aleve [naproxen sodium]; and Percocet [oxycodone-acetaminophen]  Family History          Problem Relation Age of Onset    Alzheimer's Disease Mother     High Blood Pressure Mother     Cancer Father         prostate    Cancer Sister         breast cancer-twin     Sleep History    Never diagnosed with sleep apnea in the past    Social History     Social History     Socioeconomic History    Marital status:      Spouse name: Not on file    Number of children: Not on file    Years of education: Not on file    Highest education level: Not on file   Occupational History    Occupation: [employer]     Employer: [employer]   Social Needs    Financial resource strain: Not on file    Food insecurity     Worry: Not on file     Inability: Not on file   [employer] needs     Medical: Not on file     Non-medical: Not on file   Tobacco Use    Smoking status: Never Smoker    1422.61 ml   Output 100 ml   Net 1322.61 ml     I/O last 3 completed shifts: In: 1422.6 [P.O.:325; I.V.:1097.6]  Out: 100 [Urine:100]   Patient Vitals for the past 96 hrs (Last 3 readings):   Weight   03/08/20 0839 202 lb (91.6 kg)   03/08/20 0536 197 lb (89.4 kg)       Exam   General Appearance: moderately built, moderately nourished in no acute distress on room air. HEENT: Normal, Head is normocephalic, atraumatic. Oropharynx is clear and moist.  No oral thrush. PERRL  Neck - Supple, No JVD present. No tracheal deviation. Lungs - Bilateral air entry present. Good breath sounds on both sides of chest. No wheezes. No rales. Cardiovascular - Heart sounds are normal.  Regular rhythm normal rate without murmur, gallop or rub. Abdomen - Soft, nontender, nondistended, no masses or organomegaly   Neurologic - Awake, alert, oriented. There are no focal motor or sensory deficits. Extremities - No cyanosis, clubbing or edema. Musculoskeletal: Normal range of motion. Patient exhibits no tenderness. Lymphadenopathy:  No cervical adenopathy. Psychiatric: Patient  has a normal mood and affect. Skin - No bruising or bleeding. Genitourinary: Left testicle is absent.     Labs  - Old records and notes have been reviewed in [facility]   ABG  No results found for: PH, PO2, PCO2, HCO3, O2SAT  No results found for: IFIO2, MODE, SETTIDVOL, SETPEEP  CBC  Recent Labs     03/08/20  0702 03/09/20  0448   WBC 7.5 10.6   RBC 5.25 5.06   HGB 16.2 15.5   HCT 47.8 46.0   MCV 91.0 90.9   MCH 30.9 30.6   MCHC 33.9 33.7    209   MPV 11.2 11.0      BMP  Recent Labs     03/08/20  0702      K 3.6      CO2 24   BUN 20   CREATININE 0.8   GLUCOSE 127*   CALCIUM 9.6     LFT  Recent Labs     03/08/20  0702 03/09/20  0447   AST 21 25   ALT 27 25   BILITOT 0.5 0.4   ALKPHOS 71 66   LIPASE 28.2  --      TROP  Lab Results   Component Value Date    TROPONINT < 0.010 03/08/2020    TROPONINT < 0.010 03/08/2020    TROPONINT < 0.010 03/08/2020     BNP  No results for input(s): BNP in the last 72 hours. Lactic Acid  No results for input(s): LACTA in the last 72 hours. INR  No results for input(s): INR, PROTIME in the last 72 hours. PTT  No results for input(s): APTT in the last 72 hours. Glucose  No results for input(s): POCGLU in the last 72 hours. UA No results for input(s): SPECGRAV, PHUR, COLORU, CLARITYU, MUCUS, PROTEINU, BLOODU, RBCUA, WBCUA, BACTERIA, NITRU, GLUCOSEU, BILIRUBINUR, UROBILINOGEN, KETUA, LABCAST, LABCASTTY, AMORPHOS in the last 72 hours. Invalid input(s): CRYSTALS. PFTs   None in [facility]    Sleep studies   None in Epic    Cultures    None. EKG     Echocardiogram   None in Epic. Radiology    CXR  Mar 8, 2020   PROCEDURE: XR CHEST (2 VW)   No acute cardiopulmonary disease. CT Scans  (See actual reports for details)  Mar 8, 2020   PROCEDURE: CTA CHEST W WO CONTRAST   1. No evidence of dissection 2. 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 Assessment   -3.3 x 2.9 x 5.0 cm lobulated heterogeneously enhancing mass in anterior mediastinum. Most likely differential is that of thymoma or lymphoma. Pseudoaneurysm is not entirely excluded but considered unlikely.  -Hypertensive cardiovascular disease.  -Hypertension- Not under good control  -Left side chest pain of ? Etiology. He is currently on NTG drip.  -Left testicle is absent. Plan   -Will consult cardiothoracic surgery service with Dr. Argelia Pichardo MD for further evaluation of 3.3 x 2.9 x 5.0 cm lobulated heterogeneously enhancing mass in anterior mediastinum.  -The anterior mediastinal mass can not be accessed by EBUS procedure  -Discussed with Dr.Jay Kylee MD regarding patient condition and management plan. -Chest pain management per primary service. He is currently on NTG drip.   -Hypertension management per primary service.  -Follow official cardiac cath report done by Dr. Collins Leodan  -Deep Venous Thrombosis Prophylaxis: heparin. \"Thank you for asking us to see this patient\"     Vishnu Cardenas educated about my impression and plan. He verbalizes understanding. Questions and concerns addressed.     Electronically signed by   Radha Valentin MD on 3/9/2020 at 7:06 AM

## 2020-03-09 NOTE — PROGRESS NOTES
INR    Assessment:     Active Problems:    Unstable angina pectoris (HCC)    Chest pain with high risk for cardiac etiology  Resolved Problems:    * No resolved hospital problems.  *      Plan:     ptient still having back pain and sob    stable cad    bp is still high    medication adjusted    ambulate

## 2020-03-09 NOTE — OP NOTE
[facility]  Sedation/Analgesia Post Sedation Record      Pt Name: Lidia Short  MRN: [id number]  YOB: 1950  Procedure Performed By: Ashlyn Estrada MD  Primary Care Physician: Jacob Vasquez MD    POST-PROCEDURE    Physician: Ashlyn Estrada MD    Procedure Performed:  ivus of lad and Left Heart Cath    Sedation/Anesthesia:  Local Anesthesia and IV Conscious Sedation with continuous O2 monitoring    Estimated Blood Loss:  Minimal    Specimens Removed:  None    Complications:  None     Post Procedure Diagnosis/Findings:  Coronary Artery Disease    Recommendations:  Medical treatment and review films.        Ashlyn Estrada MD  Electronically signed 3/8/2020 at 9:41 PM

## 2020-03-09 NOTE — CONSULTS
Normocephalic and atraumatic. NECK:  Supple. No JVD. No bruit. No thyromegaly. No lymphadenopathy. CHEST:  Bilateral air entry with no rales or rhonchi. HEART:  S1 and S2. No S3 and S4. No murmur. ABDOMEN:  Soft. No organomegaly. EXTREMITIES:  No clubbing, no cyanosis, and no edema. LABORATORY DATA:  His BUN is normal at 20, creatinine 0.8, blood sugar  127. His liver profile is normal.  He has a normal CBC. CTA of the  chest, lobulated heterogeneous enhancing mass in the anterior  mediastinum. IMPRESSION:  This is a 42-year-old gentleman who developed pain two days  back; prior to that the patient had no symptoms, no loss of weight, no  sweating, no fever. His blood work is completely normal.  The patient  does have _____ lesion in the anterior mediastinum, this could be _____. I am going to order CAT scan of the abdomen and pelvis to complete the  restaging. I am going to order LDH. I will be following the patient. We thank you for the consult.         Hiram Mayen M.D.    D: 03/09/2020 13:02:08       T: 03/09/2020 13:51:46     [email or web address]  Job#: [id number]     Doc#: [id number]    CC:

## 2020-03-09 NOTE — PROGRESS NOTES
Patient vomiting. This nurse and dalton Grider at bedside. administered Zofran and checked vitals. Patient denies chest pain and shortness of breath.

## 2020-03-09 NOTE — PROGRESS NOTES
Inpatient Cardiac Rehabilitation Consult    Received consult for Phase II Cardiac Rehabilitation. Will follow patient progress for qualifying diagnosis.

## 2020-03-09 NOTE — CARE COORDINATION
3/9/20, 10:47 AM EDT  DISCHARGE PLANNING EVALUATION:    Chase Lee       Admitted from: ED 3/8/2020/ 1 [facility] day: 1   Location: [address] [place] [address]- Reason for admit: Chest pain with high risk for cardiac etiology [R07.9]  Unstable angina pectoris ([facility] [address] [I20.0] Status: IP  Admit order signed?: yes  PMH:  has a past medical history of Hypertension. Procedure: 3/8 Cardiac cath - report pending  Pertinent abnormal Imaging: 3/8 CTA chest - No dissection seen. 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 Medications:  Scheduled Meds:   metoprolol  5 mg Intravenous Once    sodium chloride flush  10 mL Intravenous 2 times per day    atorvastatin  40 mg Oral Nightly    aspirin  81 mg Oral Daily    Vitamin D  4,000 Units Oral Daily    cloNIDine  0.2 mg Oral BID    hydroCHLOROthiazide  25 mg Oral Daily    cetirizine  10 mg Oral Daily    metoprolol tartrate  50 mg Oral BID    amLODIPine  5 mg Oral Daily    spironolactone  50 mg Oral Daily     Continuous Infusions:   nitroGLYCERIN 30 mcg/min (03/09/20 0645)      Pertinent Info/Orders/Treatment Plan:  Admitted through ED with new onset chest pain. Partially relieved with sl nitro. Troponins negative. Taken to cath lab due to persistent pain. NTG gtt continued at 30mcg this am. Attempting to wean. CTA of chest was done and showed a mediastinal mass. Consulted [facility], Pulmonology, Oncology, GI (nausea/vomiting), and Ortho (low back pain). MRI of thoracic spine to be done. PET scan ordered. Diet: DIET CARDIAC; No Caffeine   Smoking status:  reports that he has never smoked.  He has never used smokeless tobacco.   PCP: Allegra Persaud MD  Readmission 30 days or less: No  Readmission Risk Score: 10%    Discharge Planning Evaluation  Current Residence:  Private Residence  Living Arrangements:  Family Members   Support Systems:  Family Members  Current Services PTA:     Potential Assistance Needed:  N/A  Potential Assistance Purchasing Medications: No  Does patient want to participate in local refill/ meds to beds program?  No  Type of Home Care Services:  None  Patient expects to be discharged to:  home  Expected Discharge date:  03/10/20  Follow Up Appointment: Best Day/ Time: Friday AM    Patient Goals/Plan/Treatment Preferences: Spoke with pt. He lives at home with his wife. He continues to work as a trevino, he is very active and independent in his care. He drives. Denies any DME or HH currently or the need for any at discharge. Monitor for needs throughout stay. Transportation/Food Security/Housekeeping Addressed:  No issues identified.  Evaluation: not at this time.

## 2020-03-10 VITALS
WEIGHT: 198.1 LBS | OXYGEN SATURATION: 94 % | RESPIRATION RATE: 16 BRPM | BODY MASS INDEX: 31.09 KG/M2 | HEIGHT: 67 IN | DIASTOLIC BLOOD PRESSURE: 88 MMHG | SYSTOLIC BLOOD PRESSURE: 183 MMHG | TEMPERATURE: 98.4 F | HEART RATE: 85 BPM

## 2020-03-10 LAB
BETA HCG QUANT, MALE: < 1 IU/L (ref 0–3)
PLATELET # BLD: 226 THOU/MM3 (ref 130–400)

## 2020-03-10 PROCEDURE — 6360000002 HC RX W HCPCS: Performed by: INTERNAL MEDICINE

## 2020-03-10 PROCEDURE — 6370000000 HC RX 637 (ALT 250 FOR IP): Performed by: INTERNAL MEDICINE

## 2020-03-10 PROCEDURE — 36415 COLL VENOUS BLD VENIPUNCTURE: CPT

## 2020-03-10 PROCEDURE — 2580000003 HC RX 258: Performed by: INTERNAL MEDICINE

## 2020-03-10 PROCEDURE — APPSS30 APP SPLIT SHARED TIME 16-30 MINUTES: Performed by: NURSE PRACTITIONER

## 2020-03-10 PROCEDURE — 99152 MOD SED SAME PHYS/QHP 5/>YRS: CPT | Performed by: INTERNAL MEDICINE

## 2020-03-10 PROCEDURE — 99232 SBSQ HOSP IP/OBS MODERATE 35: CPT | Performed by: THORACIC SURGERY (CARDIOTHORACIC VASCULAR SURGERY)

## 2020-03-10 PROCEDURE — 88305 TISSUE EXAM BY PATHOLOGIST: CPT

## 2020-03-10 PROCEDURE — 3609012400 HC EGD TRANSORAL BIOPSY SINGLE/MULTIPLE: Performed by: INTERNAL MEDICINE

## 2020-03-10 PROCEDURE — 94760 N-INVAS EAR/PLS OXIMETRY 1: CPT

## 2020-03-10 PROCEDURE — 85049 AUTOMATED PLATELET COUNT: CPT

## 2020-03-10 PROCEDURE — 0DB68ZX EXCISION OF STOMACH, VIA NATURAL OR ARTIFICIAL OPENING ENDOSCOPIC, DIAGNOSTIC: ICD-10-PCS | Performed by: INTERNAL MEDICINE

## 2020-03-10 PROCEDURE — 6370000000 HC RX 637 (ALT 250 FOR IP): Performed by: THORACIC SURGERY (CARDIOTHORACIC VASCULAR SURGERY)

## 2020-03-10 PROCEDURE — 99153 MOD SED SAME PHYS/QHP EA: CPT | Performed by: INTERNAL MEDICINE

## 2020-03-10 PROCEDURE — 99232 SBSQ HOSP IP/OBS MODERATE 35: CPT | Performed by: INTERNAL MEDICINE

## 2020-03-10 RX ORDER — FENTANYL CITRATE 50 UG/ML
INJECTION, SOLUTION INTRAMUSCULAR; INTRAVENOUS PRN
Status: DISCONTINUED | OUTPATIENT
Start: 2020-03-10 | End: 2020-03-10 | Stop reason: ALTCHOICE

## 2020-03-10 RX ORDER — CLONIDINE HYDROCHLORIDE 0.2 MG/1
0.2 TABLET ORAL 2 TIMES DAILY
Qty: 60 TABLET | Refills: 3 | Status: SHIPPED | OUTPATIENT
Start: 2020-03-10 | End: 2020-07-09 | Stop reason: SDUPTHER

## 2020-03-10 RX ORDER — ATORVASTATIN CALCIUM 40 MG/1
40 TABLET, FILM COATED ORAL NIGHTLY
Qty: 30 TABLET | Refills: 3 | Status: SHIPPED | OUTPATIENT
Start: 2020-03-10 | End: 2020-09-14 | Stop reason: SINTOL

## 2020-03-10 RX ORDER — FENTANYL CITRATE 50 UG/ML
INJECTION, SOLUTION INTRAMUSCULAR; INTRAVENOUS
Status: DISPENSED
Start: 2020-03-10 | End: 2020-03-10

## 2020-03-10 RX ORDER — METOPROLOL TARTRATE 50 MG/1
50 TABLET, FILM COATED ORAL 2 TIMES DAILY
Qty: 60 TABLET | Refills: 3 | Status: SHIPPED | OUTPATIENT
Start: 2020-03-10 | End: 2020-08-04 | Stop reason: SDUPTHER

## 2020-03-10 RX ORDER — AMLODIPINE BESYLATE 5 MG/1
5 TABLET ORAL DAILY
Qty: 30 TABLET | Refills: 3 | Status: SHIPPED | OUTPATIENT
Start: 2020-03-11 | End: 2020-07-15 | Stop reason: DRUGHIGH

## 2020-03-10 RX ORDER — PANTOPRAZOLE SODIUM 20 MG/1
20 TABLET, DELAYED RELEASE ORAL DAILY
Qty: 30 TABLET | Refills: 3 | Status: SHIPPED | OUTPATIENT
Start: 2020-03-10 | End: 2020-07-09 | Stop reason: SDUPTHER

## 2020-03-10 RX ORDER — MIDAZOLAM HYDROCHLORIDE 1 MG/ML
INJECTION INTRAMUSCULAR; INTRAVENOUS
Status: DISPENSED
Start: 2020-03-10 | End: 2020-03-10

## 2020-03-10 RX ORDER — NITROGLYCERIN 0.4 MG/1
0.4 TABLET SUBLINGUAL EVERY 5 MIN PRN
Qty: 25 TABLET | Refills: 3 | Status: SHIPPED | OUTPATIENT
Start: 2020-03-10

## 2020-03-10 RX ORDER — MIDAZOLAM HYDROCHLORIDE 1 MG/ML
INJECTION INTRAMUSCULAR; INTRAVENOUS PRN
Status: DISCONTINUED | OUTPATIENT
Start: 2020-03-10 | End: 2020-03-10 | Stop reason: ALTCHOICE

## 2020-03-10 RX ORDER — SPIRONOLACTONE 50 MG/1
50 TABLET, FILM COATED ORAL DAILY
Qty: 30 TABLET | Refills: 3 | Status: SHIPPED | OUTPATIENT
Start: 2020-03-11 | End: 2020-07-09 | Stop reason: SDUPTHER

## 2020-03-10 RX ADMIN — ASPIRIN 81 MG 81 MG: 81 TABLET ORAL at 09:02

## 2020-03-10 RX ADMIN — HYDRALAZINE HYDROCHLORIDE 10 MG: 20 INJECTION INTRAMUSCULAR; INTRAVENOUS at 03:51

## 2020-03-10 RX ADMIN — ATORVASTATIN CALCIUM 40 MG: 40 TABLET, FILM COATED ORAL at 19:32

## 2020-03-10 RX ADMIN — VITAMIN D, TAB 1000IU (100/BT) 4000 UNITS: 25 TAB at 09:03

## 2020-03-10 RX ADMIN — SPIRONOLACTONE 50 MG: 25 TABLET ORAL at 09:02

## 2020-03-10 RX ADMIN — METOPROLOL TARTRATE 50 MG: 50 TABLET, FILM COATED ORAL at 09:02

## 2020-03-10 RX ADMIN — METOPROLOL TARTRATE 50 MG: 50 TABLET, FILM COATED ORAL at 19:31

## 2020-03-10 RX ADMIN — TRAMADOL HYDROCHLORIDE 50 MG: 50 TABLET, FILM COATED ORAL at 02:02

## 2020-03-10 RX ADMIN — ACETAMINOPHEN 650 MG: 325 TABLET ORAL at 05:18

## 2020-03-10 RX ADMIN — CLONIDINE HYDROCHLORIDE 0.2 MG: 0.2 TABLET ORAL at 09:02

## 2020-03-10 RX ADMIN — Medication 10 ML: at 08:15

## 2020-03-10 RX ADMIN — HYDROCHLOROTHIAZIDE 25 MG: 25 TABLET ORAL at 09:02

## 2020-03-10 RX ADMIN — CLONIDINE HYDROCHLORIDE 0.2 MG: 0.2 TABLET ORAL at 19:32

## 2020-03-10 RX ADMIN — AMLODIPINE BESYLATE 5 MG: 5 TABLET ORAL at 11:47

## 2020-03-10 ASSESSMENT — PAIN SCALES - GENERAL
PAINLEVEL_OUTOF10: 3
PAINLEVEL_OUTOF10: 0
PAINLEVEL_OUTOF10: 3

## 2020-03-10 NOTE — PROGRESS NOTES
Patient arrived to PACU.    2198 vitals as charted, patient denies pain. 3052  Dr. Kaycee Hill updated that son is not in waiting room and we can let son know that he will be rounding this evening on 3b or he can reach him through the office. 1990  Patient tolerated sips of water. 0800 report called to [address] 3b.  1715  Transport here to take patient back to 3b.

## 2020-03-10 NOTE — PRE SEDATION
Continuous, Grace Franklin MD, Stopped at 03/09/20 1500    sodium chloride flush 0.9 % injection 10 mL, 10 mL, Intravenous, 2 times per day, Yesi Kemp MD, 10 mL at 03/09/20 2000    sodium chloride flush 0.9 % injection 10 mL, 10 mL, Intravenous, PRN, Yesi Kemp MD, 10 mL at 03/08/20 1229    acetaminophen (TYLENOL) tablet 650 mg, 650 mg, Oral, Q6H PRN, 650 mg at 03/10/20 0518 **OR** acetaminophen (TYLENOL) suppository 650 mg, 650 mg, Rectal, Q6H PRN, Yesi Kemp MD    polyethylene glycol (GLYCOLAX) packet 17 g, 17 g, Oral, Daily PRN, Yesi Kemp MD    promethazine (PHENERGAN) tablet 12.5 mg, 12.5 mg, Oral, Q6H PRN **OR** ondansetron (ZOFRAN) injection 4 mg, 4 mg, Intravenous, Q6H PRN, Yesi Kemp MD, 4 mg at 03/09/20 1959    atorvastatin (LIPITOR) tablet 40 mg, 40 mg, Oral, Nightly, Yesi Kemp MD, 40 mg at 03/09/20 2005    nitroGLYCERIN (NITROSTAT) SL tablet 0.4 mg, 0.4 mg, Sublingual, Q5 Min PRN, Shermanjorgito Haas MD    albuterol sulfate  (90 Base) MCG/ACT inhaler 2 puff, 2 puff, Inhalation, Q4H PRN, Yesi Kemp MD    aspirin chewable tablet 81 mg, 81 mg, Oral, Daily, Yesi Kemp MD, 81 mg at 03/09/20 0854    Vitamin D (CHOLECALCIFEROL) tablet 4,000 Units, 4,000 Units, Oral, Daily, Yesi Kemp MD, 4,000 Units at 03/09/20 0856    cloNIDine (CATAPRES) tablet 0.2 mg, 0.2 mg, Oral, BID, Yesi Kemp MD, 0.2 mg at 03/09/20 2001    hydroCHLOROthiazide (HYDRODIURIL) tablet 25 mg, 25 mg, Oral, Daily, Yesi Kemp MD, 25 mg at 03/09/20 0855    cetirizine (ZYRTEC) tablet 10 mg, 10 mg, Oral, Daily, Sherman Rita Haas MD    morphine (PF) injection 2 mg, 2 mg, Intravenous, Q2H PRN, Yesi Kemp MD, 2 mg at 03/09/20 0654    metoprolol tartrate (LOPRESSOR) tablet 50 mg, 50 mg, Oral, BID, Yesi Kemp MD, 50 mg at 03/09/20 2001    amLODIPine (NORVASC) tablet 5 mg, 5 mg, Oral, Daily, Yesi Kemp MD, 5 mg at 03/09/20 1212    hydrALAZINE (APRESOLINE) injection 10 mg, 10 mg, Intravenous, Q6H PRN, Kim Hughes MD, 10 mg at 03/10/20 0351    spironolactone (ALDACTONE) tablet 50 mg, 50 mg, Oral, Daily, Kim Hughes MD, 50 mg at 03/09/20 0856  Prior to Admission medications    Medication Sig Start Date End Date Taking?  Authorizing Provider   aspirin 81 MG chewable tablet Take 81 mg by mouth daily   Yes Historical Provider, MD   loratadine (CLARITIN) 10 MG capsule Take 10 mg by mouth daily   Yes Historical Provider, MD   cloNIDine (CATAPRES) 0.2 MG tablet TAKE ONE TABLET BY MOUTH TWICE DAILY 5/24/19  Yes Jose Suresh MD   hydrochlorothiazide (HYDRODIURIL) 25 MG tablet Take 1 tablet by mouth daily 5/24/19  Yes Jose Suresh MD   Misc Natural Products (Elyce Deter COMPLEX/MSM PO) Take by mouth daily   Yes Historical Provider, MD   Omega-3 Fatty Acids (FISH OIL PO) Take by mouth daily   Yes Historical Provider, MD   Multiple Vitamins-Minerals (MULTIVITAMIN PO) Take by mouth daily   Yes Historical Provider, MD   Coenzyme Q10 (CO Q 10 PO) Take by mouth daily   Yes Historical Provider, MD [place] 450 MG CAPS Take by mouth 2 times daily   Yes Historical Provider, MD   Cholecalciferol (VITAMIN D) 2000 units CAPS capsule Take 4,000 Units by mouth daily   Yes Historical Provider, MD   albuterol sulfate  (90 Base) MCG/ACT inhaler INHALE 1 TO 2 PUFFS BY MOUTH EVERY 4 HOURS AS NEEDED FOR WHEEZING OR  SHORTNESS  OF  BREATH 7/22/19   Jose Suresh MD   MILK THISTLE PO Take 1 tablet by mouth daily    Historical Provider, MD     Additional information:       PHYSICAL:   Heart:  [x]Regular rate and rhythm  []Other:    Lungs:  [x]Clear    []Other:    Abdomen: [x]Soft    []Other:    Mental Status: [x]Alert & Oriented  []Other:      VITAL SIGNS   Patient Vitals for the past 24 hrs:   BP Temp Temp src Pulse Resp SpO2 Weight   03/10/20 0500 (!) 171/83 -- -- 86 -- -- --   03/10/20 0330 (!) 171/86 97.8 °F (36.6 °C) Oral 88 16 95 % 198 lb 1.6 oz (89.9 kg)   03/10/20 0202 (!) and expectations with patient and/or responsible adult completed. [x]Patient examined immediately prior to the procedure.  (Refer to nursing sedation/analgesia documentation record)    Grazyna Silverman MD   Electronically signed 3/10/2020 at 7:19 AM

## 2020-03-10 NOTE — PROGRESS NOTES
Patient here for EGD. Scope used GIF SRE#[id number]. Pictures taken. Cold forcep biopy taken and placed in 1 jar. EGD completed and patient tolerated well.

## 2020-03-10 NOTE — PROGRESS NOTES
by a pulmonologist in the past:No  Prior history of lung nodule/Lung mass:No  He was ever diagnosed with lung cancer:No    History of tobacco smoking:No    He denies cough, hemoptysis or expectoration. He admits to loss of weight with decreased appetite changes. He denies connective tissue diseases or Vasculitis. History of pulmonary tuberculosis in the past: No  Recent exposure to any patients with tuberculosis:No  Recent travel to endemic places of tuberculosis: No    He is currently not using any oxygen supplementation at rest, exercise or during sleep/at night time. No recent hospitalizations or emergency room visits. He denies any lung cancer in first-degree relative; exposure to asbestos, radon, or uranium. Past 24 Hours   -Stable on room air (on NC after EGD)  -Afebrile  -EGD today  -No pulmonary issues    -All systems reviewed.    PMHx   Past Medical History      Diagnosis Date    Hypertension       Past Surgical History        Procedure Laterality Date    HERNIA REPAIR  5/30/12    [facility] [address] [place]  2-4-14    Repair recurrent incarcerated left inguinal hernia with mesh    MOUTH SURGERY      several years ago-removed 2-3 back teeth-wisdom teeth     Meds    Current Medications    midazolam        fentaNYL        metoprolol  5 mg Intravenous Once    sodium chloride flush  10 mL Intravenous 2 times per day    atorvastatin  40 mg Oral Nightly    aspirin  81 mg Oral Daily    Vitamin D  4,000 Units Oral Daily    cloNIDine  0.2 mg Oral BID    hydroCHLOROthiazide  25 mg Oral Daily    cetirizine  10 mg Oral Daily    metoprolol tartrate  50 mg Oral BID    amLODIPine  5 mg Oral Daily    spironolactone  50 mg Oral Daily     traMADol, potassium chloride **OR** potassium alternative oral replacement **OR** potassium chloride, sodium chloride flush, acetaminophen **OR** acetaminophen, polyethylene glycol, promethazine **OR** ondansetron, nitroGLYCERIN, albuterol sulfate HFA, morphine, hydrALAZINE  IV Drips/Infusions   nitroGLYCERIN Stopped (03/09/20 1500)     Home Medications  Medications Prior to Admission: aspirin 81 MG chewable tablet, Take 81 mg by mouth daily  loratadine (CLARITIN) 10 MG capsule, Take 10 mg by mouth daily  cloNIDine (CATAPRES) 0.2 MG tablet, TAKE ONE TABLET BY MOUTH TWICE DAILY  hydrochlorothiazide (HYDRODIURIL) 25 MG tablet, Take 1 tablet by mouth daily  Misc Natural Products (GLUCOSAMINE CHOND COMPLEX/MSM PO), Take by mouth daily  Omega-3 Fatty Acids (FISH OIL PO), Take by mouth daily  Multiple Vitamins-Minerals (MULTIVITAMIN PO), Take by mouth daily  Coenzyme Q10 (CO Q 10 PO), Take by mouth daily  [place] 450 MG CAPS, Take by mouth 2 times daily  Cholecalciferol (VITAMIN D) 2000 units CAPS capsule, Take 4,000 Units by mouth daily  albuterol sulfate  (90 Base) MCG/ACT inhaler, INHALE 1 TO 2 PUFFS BY MOUTH EVERY 4 HOURS AS NEEDED FOR WHEEZING OR  SHORTNESS  OF  BREATH  MILK THISTLE PO, Take 1 tablet by mouth daily  Diet    DIET CARDIAC; Allergies    Ace inhibitors;  Aleve [naproxen sodium]; and Percocet [oxycodone-acetaminophen]  Family History          Problem Relation Age of Onset    Alzheimer's Disease Mother     High Blood Pressure Mother     Cancer Father         prostate    Cancer Sister         breast cancer-twin     Sleep History    Never diagnosed with sleep apnea in the past    Social History     Social History     Socioeconomic History    Marital status:      Spouse name: Not on file    Number of children: Not on file    Years of education: Not on file    Highest education level: Not on file   Occupational History    Occupation: "[employer]     Employer: [employer]   Social Needs    Financial resource strain: Not on file    Food insecurity     Worry: Not on file     Inability: Not on file   [employer] needs     Medical: Not on file     Non-medical: Not on file   Tobacco Use    Smoking normal.  Regular rhythm normal rate without murmur, gallop or rub. Abdomen - Soft, nontender, nondistended, no masses or organomegaly   Neurologic - Awake, alert, oriented. There are no focal motor or sensory deficits. Extremities - No cyanosis, clubbing or edema. Musculoskeletal: Normal range of motion. Patient exhibits no tenderness. Lymphadenopathy:  No cervical adenopathy. Psychiatric: Patient  has a normal mood and affect. Skin - No bruising or bleeding. Genitourinary: Left testicle is absent. Physical Exam   Constitutional: No distress on O2 per NC. Patient appears moderately built and  moderately nourished. Head: Normocephalic and atraumatic. Mouth/Throat: Oropharynx is clear and moist.  No oral thrush. Eyes: Conjunctivae are normal. Pupils are equal, round. No scleral icterus. Neck: Neck supple. No tracheal deviation present. Cardiovascular: S1 and S2 with no murmur. No peripheral edema  Pulmonary/Chest: Normal effort with bilateral air entry, clear breath sounds. No stridor. No respiratory distress. Patient exhibits no tenderness. Abdominal: Soft. Bowel sounds audible. No distension or tenderness to palp. Musculoskeletal: Moves all extremities  Neurological: Patient is alert and oriented to person, place, and time. Skin: Warm and dry.      Labs  - Old records and notes have been reviewed in [facility]   ABG  No results found for: PH, PO2, PCO2, HCO3, O2SAT  No results found for: NATACHA Barkley  CBC  Recent Labs     03/08/20  0702 03/09/20  0448 03/10/20  0419   WBC 7.5 10.6  --    RBC 5.25 5.06  --    HGB 16.2 15.5  --    HCT 47.8 46.0  --    MCV 91.0 90.9  --    MCH 30.9 30.6  --    MCHC 33.9 33.7  --     209 226   MPV 11.2 11.0  --       BMP  Recent Labs     03/08/20  0702 03/09/20  1514     --    K 3.6 3.1*     --    CO2 24  --    BUN 20  --    CREATININE 0.8  --    GLUCOSE 127*  --    CALCIUM 9.6  --      LFT  Recent Labs     03/08/20  0702

## 2020-03-10 NOTE — CONSULTS
Inpatient Consultation    Claritza Muhammad (1950)  3/10/2020    Reason for Consult:  Low back pain  Requesting Physician: Dr. Sasha Mccray:  Low back pain    History Obtained From:  Patient and EMR    HISTORY OF PRESENT ILLNESS:                The patient is a 72 y.o. male who presents with above chief complaint. The patient was admitted on 3/8 for chest pain that radiated around to the back with shortness of breath. MRI thoracic spine completed and shows no significant stenosis. Patient reports he developed low back pain since laying in bed during his admission. Pain is better controlled today. Worse with movement, improved with rest. Modifying factors include pain medication. He denies leg pain or numbness/tingling. No bowel or bladder incontinence. CT abd/pelvis shows L4-S1 DDD.      Past Medical History:        Diagnosis Date    Hypertension      Past Surgical History:        Procedure Laterality Date    HERNIA REPAIR  5/30/12    [facility] [address] Nancie May    [address] [facility]  2-4-14    Repair recurrent incarcerated left inguinal hernia with mesh    MOUTH SURGERY      several years ago-removed 2-3 back teeth-wisdom teeth     Current Medications:   Current Facility-Administered Medications: midazolam (VERSED) 5 MG/5ML injection, , ,   fentaNYL (SUBLIMAZE) 100 MCG/2ML injection, , ,   traMADol (ULTRAM) tablet 50 mg, 50 mg, Oral, Q4H PRN  potassium chloride (KLOR-CON M) extended release tablet 40 mEq, 40 mEq, Oral, PRN **OR** potassium bicarb-citric acid (EFFER-K) effervescent tablet 40 mEq, 40 mEq, Oral, PRN **OR** potassium chloride 10 mEq/100 mL IVPB (Peripheral Line), 10 mEq, Intravenous, PRN  metoprolol (LOPRESSOR) injection 5 mg, 5 mg, Intravenous, Once  nitroGLYCERIN 50 mg in dextrose 5% 250 mL infusion, 5 mcg/min, Intravenous, Continuous  sodium chloride flush 0.9 % injection 10 mL, 10 mL, Intravenous, 2 times per day  sodium chloride flush 0.9 % injection 10 mL, 10 mL, Intravenous, PRN  acetaminophen (TYLENOL) tablet 650 mg, 650 mg, Oral, Q6H PRN **OR** acetaminophen (TYLENOL) suppository 650 mg, 650 mg, Rectal, Q6H PRN  polyethylene glycol (GLYCOLAX) packet 17 g, 17 g, Oral, Daily PRN  promethazine (PHENERGAN) tablet 12.5 mg, 12.5 mg, Oral, Q6H PRN **OR** ondansetron (ZOFRAN) injection 4 mg, 4 mg, Intravenous, Q6H PRN  atorvastatin (LIPITOR) tablet 40 mg, 40 mg, Oral, Nightly  nitroGLYCERIN (NITROSTAT) SL tablet 0.4 mg, 0.4 mg, Sublingual, Q5 Min PRN  albuterol sulfate  (90 Base) MCG/ACT inhaler 2 puff, 2 puff, Inhalation, Q4H PRN  aspirin chewable tablet 81 mg, 81 mg, Oral, Daily  Vitamin D (CHOLECALCIFEROL) tablet 4,000 Units, 4,000 Units, Oral, Daily  cloNIDine (CATAPRES) tablet 0.2 mg, 0.2 mg, Oral, BID  hydroCHLOROthiazide (HYDRODIURIL) tablet 25 mg, 25 mg, Oral, Daily  cetirizine (ZYRTEC) tablet 10 mg, 10 mg, Oral, Daily  morphine (PF) injection 2 mg, 2 mg, Intravenous, Q2H PRN  metoprolol tartrate (LOPRESSOR) tablet 50 mg, 50 mg, Oral, BID  amLODIPine (NORVASC) tablet 5 mg, 5 mg, Oral, Daily  hydrALAZINE (APRESOLINE) injection 10 mg, 10 mg, Intravenous, Q6H PRN  spironolactone (ALDACTONE) tablet 50 mg, 50 mg, Oral, Daily  Allergies:  Ace inhibitors; Aleve [naproxen sodium]; and Percocet [oxycodone-acetaminophen]    Social History:   TOBACCO:   reports that he has never smoked. He has never used smokeless tobacco.  ETOH:   reports no history of alcohol use. DRUGS:   reports no history of drug use.   Family History:       Problem Relation Age of Onset    Alzheimer's Disease Mother     High Blood Pressure Mother     Cancer Father         prostate    Cancer Sister         breast cancer-twin       REVIEW OF SYSTEMS:    CONSTITUTIONAL:  negative for fevers, chills  RESPIRATORY:  negative for cough and shortness of breath  CARDIOVASCULAR:  negative for chest pain and palpitations  GASTROINTESTINAL:  negative for nausea, vomiting, bowel incontinence  GENITOURINARY:  negative for frequency and urinary incontinence  MUSCULOSKELETAL:  positive for back pain, negative for leg pain  NEUROLOGICAL:  negative for headaches, numbness/tingling  BEHAVIOR/PSYCH:  negative for depressed mood, anxiety    PHYSICAL EXAM:      CONSTITUTIONAL:  awake, alert, cooperative, no apparent distress, and appears stated age  HEAD: atraumatic, normocephalic  LUNGS:  No respiratory distress. CTA bilaterally per H&P  CARDIOVASCULAR:  RRR, no murmur per H&P  ABDOMEN:  Soft, non-distended, non-tender  MUSCULOSKELETAL:  There is no redness, warmth, or swelling of the joints. Full range of motion noted. Motor strength is 5 out of 5 all extremities bilaterally LE. No TTP over the lumbar spine. Patient turns well in bed. NEUROLOGIC:  Awake, alert, oriented to name, place and time. Sensory intact bilateral LE    DATA:    CBC:   Lab Results   Component Value Date    WBC 10.6 03/09/2020    RBC 5.06 03/09/2020    RBC 4.78 05/27/2012    HGB 15.5 03/09/2020    HCT 46.0 03/09/2020    MCV 90.9 03/09/2020    MCH 30.6 03/09/2020    MCHC 33.7 03/09/2020    RDW 14.1 05/04/2019     03/10/2020    MPV 11.0 03/09/2020     WBC:    Lab Results   Component Value Date    WBC 10.6 03/09/2020     Hemoglobin/Hematocrit:    Lab Results   Component Value Date    HGB 15.5 03/09/2020    HCT 46.0 03/09/2020     CMP:    Lab Results   Component Value Date     03/08/2020    K 3.1 03/09/2020     03/08/2020    CO2 24 03/08/2020    BUN 20 03/08/2020    CREATININE 0.8 03/08/2020    AGRATIO 1.7 05/04/2019    LABGLOM >90 03/08/2020    GLUCOSE 127 03/08/2020    GLUCOSE 96 05/04/2019    PROT 7.2 03/09/2020    LABALBU 4.0 03/09/2020    CALCIUM 9.6 03/08/2020    BILITOT 0.4 03/09/2020    ALKPHOS 66 03/09/2020    AST 25 03/09/2020    ALT 25 03/09/2020         Radiology:   MRI Thoracic Spine      Impression    Mild degenerative changes of the thoracic spine without significant spinal canal or neuroforaminal stenosis at any thoracic level. IMPRESSION/RECOMMENDATIONS:    Assessment:   1) L4-S1 DDD  2) Low back pain    Plan:  1) Discussed with Dr. Della De Guzman. Images reviewed. MRI thoracic shows no significant stenosis. His main complaint is low back pain without radicular symptoms. Symptoms began after laying in bed since admission. CT abd/pelvis reviewed and shows L4-S1 DDD. His pain is controlled today. PT/OT. Okay for discharge from ortho spine standpoint. Follow up in office in 1-2 weeks. If patient continues to have persistent issues after discharge, will order MRI as outpatient.     Mart rOtez PA-C

## 2020-03-10 NOTE — CONSULTS
[address] [place] [address]                                  CONSULTATION    PATIENT NAME: Cindy Xiong                       :        1950  MED REC NO:   [id number]                           ROOM:       [id number]  ACCOUNT NO:   [de-identified]                           ADMIT DATE: 2020  PROVIDER:     RAMY Alberto Teofilo:  2020    HISTORY OF PRESENT ILLNESS:  The patient was seen in GI consult with  nausea on 2020. The patient was seen per Dr. Kelly López request  for possible epigastric discomfort. The patient presented with chest  discomfort. A 71-year-old male. Chest pain, retrosternal.  Often  characterized sharp radiating to the back, radiating to the shoulders,  upper back, and lower back. Improved with pain medications, but not   more severe. Imaging study showed anterior mediastinal mass being worked  up by Cardiothoracic Surgery and the Oncology service. Never had upper  endoscopy done in the past.  Taking Advil as needed which helped with  the pain, but not resolved completely. Improved with pain medication at this  time. From GI point of view, very frequent heartburn. Takes Tums as  needed. Does not take all the time. No dysphagia or odynophagia. No  current weight loss; however, he lost weight in the past around two  years ago around 30 to 40 pounds, but was intentional, but over the last  few months, weight has been stable. There is no regurgitation. No  vomiting of undigested food. No early satiety. Has mild amount of  constipation. No blood in the stool. No mucous discharge with the  stool. No new medications. He has no associated fever or chills. He has no dysuria or polyuria. No jaundice. PAST MEDICAL HISTORY:  Significant for hypertension, mild gastric  reflux. PAST SURGICAL HISTORY:  Significant for hernia repair with mesh  placement, hernia repair, oral surgery. [email or web address]  Job#: [id number]     Doc#: [id number]    CC:  Devorah Wong MD

## 2020-03-10 NOTE — PROGRESS NOTES
Inpatient Cardiac Rehabilitation Consult    Received consult for Phase II Cardiac Rehabilitation. We will follow patient for a qualifying diagnosis for cardiac rehabilitation.

## 2020-03-10 NOTE — PLAN OF CARE
Problem: Pain:  Goal: Pain level will decrease  Description: Pain level will decrease  Outcome: Met This Shift  Note: Patient denies pain today. Denies need for pain meds. States his back pain is not there today. Problem: Pain:  Goal: Control of acute pain  Description: Control of acute pain  Outcome: Met This Shift     Problem: Pain:  Goal: Control of chronic pain  Description: Control of chronic pain  Outcome: Met This Shift  Note: Denies chronic pain today. Problem: Discharge Planning:  Goal: Participates in care planning  Description: Participates in care planning  Outcome: Met This Shift  Note: Updated on care plan. Consuls have given OK for discharge. Waiting on Dr. Andree Laura to round today for possible discharge. Problem: Anxiety/Stress:  Goal: Level of anxiety will decrease  Description: Level of anxiety will decrease  Outcome: Met This Shift  Note: Patient calm and cooperative today. Denies anxiety. Problem: Pain:  Goal: Recognizes and communicates pain  Description: Recognizes and communicates pain  Outcome: Met This Shift  Note: Janet pain this shift. Problem: Pain:  Goal: Control of acute pain  Description: Control of acute pain  Outcome: Met This Shift     Problem: Pain:  Goal: Control of chronic pain  Description: Control of chronic pain  Outcome: Met This Shift     Problem: Tissue Perfusion - Cardiopulmonary, Altered:  Goal: Absence of angina  Description: Absence of angina  Outcome: Met This Shift  Note: Denies chest pain. Patient able to walk in cobos-tolerated well. No chest pain. Problem: Tissue Perfusion - Cardiopulmonary, Altered:  Goal: Absence of angina  Description: Absence of angina  Outcome: Met This Shift  Note: Denies chest pain. Patient able to walk in cobos-tolerated well. No chest pain.       Problem: Falls - Risk of:  Goal: Will remain free from falls  Description: Will remain free from falls  Outcome: Met This Shift  Note: Continue fall precautions, up with assist to restroom. Problem: Falls - Risk of:  Goal: Absence of physical injury  Description: Absence of physical injury  Outcome: Met This Shift     Problem: Discharge Planning:  Goal: Discharged to appropriate level of care  Description: Discharged to appropriate level of care  Outcome: Ongoing  Note: Planning discharge to home possibly later today. Problem: Cardiac Output - Decreased:  Goal: Hemodynamic stability will improve  Description: Hemodynamic stability will improve  Outcome: Ongoing  Note: BP elevated at times. Continue to monitor. New BP meds added this admission. Problem: Tissue Perfusion - Cardiopulmonary, Altered:  Goal: Hemodynamic stability will improve  Description: Hemodynamic stability will improve  Outcome: Ongoing  Note: BP elevated at times. Continue to monitor. Care plan reviewed with patient. Patient verbalize understanding of the plan of care and contribute to goal setting.

## 2020-03-10 NOTE — PROGRESS NOTES
Pt is alert and awake. Arrived back from EDG procedure. Currently sitting in bed, son present. Speech is clear and appropriate. No reported pain at this time. No dizziness or shortness of breath stated. Upper extremities are pink, warm, and dry. No edema present or numbness and tingling. Capillary refill and skin turgor less then 3 seconds. Radial pulse 2+. S1 and S2 auscultated, NSR. No chest pain at this time. Lung sounds are clear in upper lobes bilateral and diminished in lower. Chest expansion symmetrical and unlabored. Abd is soft, round, non tender. No distention noted. Bs active x4. Skin intact throughout body. Right radial incision site patent, intact from cath. Lower extremities are pink, warm, and dry. No edema present. Pedal push and pull strong and equal. Pulses palpated to feet. Stand by assist for ambulating.

## 2020-03-11 ENCOUNTER — TELEPHONE (OUTPATIENT)
Dept: FAMILY MEDICINE CLINIC | Age: 70
End: 2020-03-11

## 2020-03-11 NOTE — TELEPHONE ENCOUNTER
Ruth [facility] Transitions Initial Follow Up Call    Outreach made within 2 business days of discharge: Yes    Patient: Isela Vasquez Patient : 1950   MRN: [id number]  Reason for Admission: There are no discharge diagnoses documented for the most recent discharge. Discharge Date: 3/10/20       Spoke with: Patient    Discharge department/facility: [facility]    TCM Interactive Patient Contact:  Was patient able to fill all prescriptions: Yes  Was patient instructed to bring all medications to the follow-up visit: Yes  Is patient taking all medications as directed in the discharge summary?  Yes  Does patient understand their discharge instructions: Yes  Does patient have questions or concerns that need addressed prior to 7-14 day follow up office visit: no    Scheduled appointment with PCP within 7-14 days    Follow Up  Future Appointments   Date Time Provider Iqra Huntley   3/16/2020  7:30 AM 3[facility] Radiolog   3/17/2020  8:45 AM Trace Michel MD 1102 [facility]   3/30/2020 12:00 PM Donna Lechuga MD SRPX CT/CV [facility] [address]   3/31/2020 10:00 AM Camila Pop MD [facility]   2020 10:15 AM Trace Michel MD [address]

## 2020-03-11 NOTE — TELEPHONE ENCOUNTER
Ruth [facility] Transitions Initial Follow Up Call    Outreach made within 2 business days of discharge: Yes    Patient: Lexie Francisco Patient : 1950   MRN: [id number]  Reason for Admission: There are no discharge diagnoses documented for the most recent discharge.   Discharge Date: 3/10/20       Spoke with: DANIELLE to return call     Discharge department/facility: [facility]        Scheduled appointment with PCP within 7-14 days    Follow Up  Future Appointments   Date Time Provider Iqra Huntley   3/16/2020  7:30 AM STR PET IMAGING ROOM [facility] PET [facility] Radiolog   3/17/2020  8:45 AM Qamar Lundberg MD 1102 [facility]   3/30/2020 12:00 PM Kandace Frey MD SRPX CT/CV P - [facility] [address]   3/31/2020 10:00 AM Kina Garzon MD [facility]   2020 10:15 AM Qamar Lundberg MD [address] [place], [place]

## 2020-03-11 NOTE — PROGRESS NOTES
Discharge teaching and instructions for diagnosis/procedure of chest pain completed with patient using teachback method. AVS reviewed. Printed prescriptions given to patient. Patient voiced understanding regarding prescriptions, follow up appointments, and care of self at home. Discharged in a wheelchair to  home with support per friend. Patient stable at discharge. Night-time BP meds given to patient per night shift LORELEI Shore. Patient voiced understanding of med side effects and post cath site care.

## 2020-03-11 NOTE — DISCHARGE SUMMARY
EGD by Dr. Titi Mandujano, had  gastritis. The patient was seen by the orthopedic surgeon. The  patient's blood pressure medication was adjusted until we had a better  control of his blood pressure. He continued to have chest pain, _____  multiple medications and the patient's condition stabilized. He will be  discharged home. He will be seen in the office. We will continue  monitoring his coronary artery disease. Consider intervention if the  patient is symptomatic and medical treatment fails. He was discharged  home in stable condition. He will be seen in the office. He is to  follow up with primary physician. Followup with the other consultants. Seek medical attention if he has any change in clinical condition.         Zane Vera M.D.    D: 03/10/2020 18:56:48       T: 03/10/2020 22:18:41     AS/[email or web address]_[email or web address]  Job#: [id number]     Doc#: [id number]    CC:

## 2020-03-16 ENCOUNTER — HOSPITAL ENCOUNTER (OUTPATIENT)
Dept: PET IMAGING | Age: 70
Discharge: HOME | End: 2020-03-16
Payer: MEDICARE

## 2020-03-16 PROCEDURE — 78815 PET IMAGE W/CT SKULL-THIGH: CPT

## 2020-03-16 PROCEDURE — 3430000000 HC RX DIAGNOSTIC RADIOPHARMACEUTICAL: Performed by: THORACIC SURGERY (CARDIOTHORACIC VASCULAR SURGERY)

## 2020-03-16 PROCEDURE — A9552 F18 FDG: HCPCS | Performed by: THORACIC SURGERY (CARDIOTHORACIC VASCULAR SURGERY)

## 2020-03-16 RX ORDER — FLUDEOXYGLUCOSE F 18 200 MCI/ML
13.9 INJECTION, SOLUTION INTRAVENOUS
Status: COMPLETED | OUTPATIENT
Start: 2020-03-16 | End: 2020-03-16

## 2020-03-16 RX ADMIN — FLUDEOXYGLUCOSE F 18 13.9 MILLICURIE: 200 INJECTION, SOLUTION INTRAVENOUS at 07:32

## 2020-03-17 ENCOUNTER — OFFICE VISIT (OUTPATIENT)
Dept: FAMILY MEDICINE CLINIC | Age: 70
End: 2020-03-17
Payer: MEDICARE

## 2020-03-17 ENCOUNTER — TELEPHONE (OUTPATIENT)
Dept: CARDIOTHORACIC SURGERY | Age: 70
End: 2020-03-17

## 2020-03-17 VITALS
HEART RATE: 68 BPM | DIASTOLIC BLOOD PRESSURE: 74 MMHG | WEIGHT: 194.2 LBS | RESPIRATION RATE: 12 BRPM | SYSTOLIC BLOOD PRESSURE: 120 MMHG | BODY MASS INDEX: 30.42 KG/M2

## 2020-03-17 PROCEDURE — 99495 TRANSJ CARE MGMT MOD F2F 14D: CPT | Performed by: FAMILY MEDICINE

## 2020-03-17 PROCEDURE — G8510 SCR DEP NEG, NO PLAN REQD: HCPCS | Performed by: FAMILY MEDICINE

## 2020-03-17 PROCEDURE — 1111F DSCHRG MED/CURRENT MED MERGE: CPT | Performed by: FAMILY MEDICINE

## 2020-03-17 RX ORDER — AMPICILLIN TRIHYDRATE 250 MG
1000 CAPSULE ORAL DAILY
COMMUNITY

## 2020-03-17 RX ORDER — MAG HYDROX/ALUMINUM HYD/SIMETH 400-400-40
1 SUSPENSION, ORAL (FINAL DOSE FORM) ORAL 2 TIMES DAILY
COMMUNITY

## 2020-03-17 RX ORDER — ASCORBIC ACID 500 MG
500 TABLET ORAL 2 TIMES DAILY
COMMUNITY

## 2020-03-17 ASSESSMENT — PATIENT HEALTH QUESTIONNAIRE - PHQ9
1. LITTLE INTEREST OR PLEASURE IN DOING THINGS: 0
2. FEELING DOWN, DEPRESSED OR HOPELESS: 0
SUM OF ALL RESPONSES TO PHQ QUESTIONS 1-9: 0
SUM OF ALL RESPONSES TO PHQ QUESTIONS 1-9: 0
SUM OF ALL RESPONSES TO PHQ9 QUESTIONS 1 & 2: 0

## 2020-03-17 NOTE — TELEPHONE ENCOUNTER
Patient called into office stating he wants to move his follow-up to earlier date if possible. Patient wishes to return to work next week. Will route to Dr. Chantell Mcelroy for furhter advice to due the policies in place with Covid-19. Please advise if patient needs an office visit or if you can call patient. Thank you.

## 2020-03-17 NOTE — PROCEDURES
[address] [id number]                            CARDIAC CATHETERIZATION    PATIENT NAME: Yanet Mckee                       :        1950  MED REC NO:   [id number]                           ROOM:       0037  ACCOUNT NO:   [de-identified]                           ADMIT DATE: 2020  PROVIDER:     Vahe Sahni. RAMY Asencio Servant:  2020    INDICATION FOR PROCEDURE:  This is a patient who is a 59-year-old  gentleman who was admitted to the hospital through the emergency room  with a diagnosis of unstable angina. The patient continued to have  chest pain despite medical treatment with IV nitroglycerin and beta  blockers. The patient elected for urgent heart cath, possible  intervention. The patient understands the procedure, benefits, risks,  alternative methods of treatment, and possible complications and wants  to be done. PROCEDURES:  1.  IV conscious sedation. The patient was given IV conscious sedation  in increment dosages by the circulating cath lab RN, monitored by the  cath lab monitor tech under my supervision. Procedure started at 01:45  and finished at 02:10 p.m. No acute complication from the procedure. The estimated blood loss about 10 mL. 2.  Left heart cath. The right radial artery was cannulated with a  6-Malaysian sheath. Coronary angiogram showed the RCA is a codominant  artery. Mild atheromatous nonobstructive disease of the RCA was  noticed. Left main was patent, bifurcates to the LAD and circumflex. Circumflex is a dominant artery. It was patent. The LAD does exhibit  about 70% to 80% narrowing proximally. Also, it involved the ostium of  the high diagonal artery about 40%. Distally, the LAD was patent. Left  ventriculogram showed a preserved systolic function of the left  ventricle. No mitral regurgitation. No gradient across the aortic  valve.     Aortic root injection

## 2020-03-17 NOTE — PROGRESS NOTES
daily      Potassium 99 MG TABS Take 1 tablet by mouth daily      Misc Natural Products (LUTEIN 20 PO) Take 1 tablet by mouth 2 times daily      SUPER B COMPLEX/C PO Take 1 tablet by mouth daily      Cinnamon 500 MG CAPS Take 1,000 mg by mouth daily      atorvastatin (LIPITOR) 40 MG tablet Take 1 tablet by mouth nightly 30 tablet 3    cloNIDine (CATAPRES) 0.2 MG tablet Take 1 tablet by mouth 2 times daily 60 tablet 3    metoprolol tartrate (LOPRESSOR) 50 MG tablet Take 1 tablet by mouth 2 times daily 60 tablet 3    amLODIPine (NORVASC) 5 MG tablet Take 1 tablet by mouth daily 30 tablet 3    spironolactone (ALDACTONE) 50 MG tablet Take 1 tablet by mouth daily 30 tablet 3    nitroGLYCERIN (NITROSTAT) 0.4 MG SL tablet Place 1 tablet under the tongue every 5 minutes as needed for Chest pain up to max of 3 total doses. If no relief after 1 dose, call 911. 25 tablet 3    pantoprazole (PROTONIX) 20 MG tablet Take 1 tablet by mouth daily 30 tablet 3    aspirin 81 MG chewable tablet Take 81 mg by mouth daily      loratadine (CLARITIN) 10 MG capsule Take 10 mg by mouth daily      albuterol sulfate  (90 Base) MCG/ACT inhaler INHALE 1 TO 2 PUFFS BY MOUTH EVERY 4 HOURS AS NEEDED FOR WHEEZING OR  SHORTNESS  OF  BREATH 18 g 3    Omega-3 Fatty Acids (FISH OIL PO) Take by mouth daily      Multiple Vitamins-Minerals (MULTIVITAMIN PO) Take by mouth daily      Cholecalciferol (VITAMIN D) 2000 units CAPS capsule Take 4,000 Units by mouth daily          Medications patient taking as of now reconciled against medications ordered at time of hospital discharge: Yes    Chief Complaint   Patient presents with    Follow-Up from Hospital       History of Present illness - Follow up of Hospital diagnosis(es):   Encounter Diagnoses   Name Primary?     Coronary artery disease due to lipid rich plaque Yes    Unstable angina pectoris (HCC)     Thymoma     Essential hypertension          Inpatient course: Discharge summary reviewed- see chart. Interval history/Current status: good    A comprehensive review of systems was negative except for what was noted in the HPI. Vitals:    03/17/20 0842   BP: 120/74   Site: Left Upper Arm   Position: Sitting   Cuff Size: Medium Adult   Pulse: 68   Resp: 12   Weight: 194 lb 3.2 oz (88.1 kg)     Body mass index is 30.42 kg/m². Wt Readings from Last 3 Encounters:   03/17/20 194 lb 3.2 oz (88.1 kg)   03/10/20 198 lb 1.6 oz (89.9 kg)   07/27/19 192 lb (87.1 kg)     BP Readings from Last 3 Encounters:   03/17/20 120/74   03/10/20 (!) 183/88   07/27/19 139/88        Physical Exam:  General Appearance: alert and oriented to person, place and time, well developed and well- nourished, in no acute distress  Skin: warm and dry, no rash or erythema  Head: normocephalic and atraumatic  Eyes: pupils equal, round, and reactive to light, extraocular eye movements intact, conjunctivae normal  ENT: tympanic membrane, external ear and ear canal normal bilaterally, nose without deformity, nasal mucosa and turbinates normal without polyps  Neck: supple and non-tender without mass, no thyromegaly or thyroid nodules, no cervical lymphadenopathy  Pulmonary/Chest: clear to auscultation bilaterally- no wheezes, rales or rhonchi, normal air movement, no respiratory distress  Cardiovascular: normal rate, regular rhythm, normal S1 and S2, no murmurs, rubs, clicks, or gallops, distal pulses intact, no carotid bruits  Abdomen: soft, non-tender, non-distended, normal bowel sounds, no masses or organomegaly  Extremities: no cyanosis, clubbing or edema  Musculoskeletal: normal range of motion, no joint swelling, deformity or tenderness  Neurologic: reflexes normal and symmetric, no cranial nerve deficit, gait, coordination and speech normal    Assessment/Plan:   Diagnosis Orders   1. Coronary artery disease due to lipid rich plaque     2. Unstable angina pectoris ([address]     3. Thymoma     4.  Essential hypertension

## 2020-03-17 NOTE — PROGRESS NOTES
Visit Information    Have you changed or started any medications since your last visit including any over-the-counter medicines, vitamins, or herbal medicines? yes - see updated med list   Are you having any side effects from any of your medications? -  no  Have you stopped taking any of your medications? Is so, why? -  yes - see updated med list    Have you seen any other physician or provider since your last visit? Yes - Records Obtained  Have you had any other diagnostic tests since your last visit? Yes - Records Obtained  Have you been seen in the emergency room and/or had an admission to a hospital since we last saw you? Yes - Records Obtained  Have you had your routine dental cleaning in the past 6 months? no    Have you activated your [employer] account? If not, what are your barriers?  Yes     Patient Care Team:  Cecile Carter MD as PCP - General (Family Medicine)  Cecile Carter MD as PCP - [facility]    Medical History Review  Past Medical, Family, and Social History reviewed and does contribute to the patient presenting condition    Health Maintenance   Topic Date Due    Diabetes screen  01/12/1991    Shingles Vaccine (1 of 2) 01/12/2001    Colon cancer screen colonoscopy  01/12/2001    Pneumococcal 65+ years Vaccine (1 of 1 - PPSV23) 01/12/2016    Flu vaccine (1) 09/01/2019    Annual Wellness Visit (AWV)  05/24/2020    Creatinine monitoring  03/08/2021    Lipid screen  03/09/2021    Potassium monitoring  03/09/2021    DTaP/Tdap/Td vaccine (3 - Td) 02/26/2029    Hepatitis C screen  Completed    Hepatitis A vaccine  Aged Out    Hepatitis B vaccine  Aged Out    Hib vaccine  Aged Out    Meningococcal (ACWY) vaccine  Aged Out

## 2020-04-17 ENCOUNTER — TELEPHONE (OUTPATIENT)
Dept: CARDIOLOGY CLINIC | Age: 70
End: 2020-04-17

## 2020-04-17 NOTE — TELEPHONE ENCOUNTER
Patient left a message he is anxious to get his surgery rescheduled. He was originally scheduled for 3-18-20 and this was cancelled due to Kip. He would like to get the surgery done so he can recover and return to work.

## 2020-04-27 ENCOUNTER — HOSPITAL ENCOUNTER (OUTPATIENT)
Age: 70
Discharge: HOME | End: 2020-04-27
Payer: MEDICARE

## 2020-04-27 LAB
ANION GAP SERPL CALCULATED.3IONS-SCNC: 11 MEQ/L (ref 8–16)
BUN BLDV-MCNC: 22 MG/DL (ref 7–22)
CALCIUM SERPL-MCNC: 9.5 MG/DL (ref 8.5–10.5)
CHLORIDE BLD-SCNC: 103 MEQ/L (ref 98–111)
CO2: 26 MEQ/L (ref 23–33)
CREAT SERPL-MCNC: 1 MG/DL (ref 0.4–1.2)
GFR SERPL CREATININE-BSD FRML MDRD: 74 ML/MIN/1.73M2
GLUCOSE BLD-MCNC: 108 MG/DL (ref 70–108)
POTASSIUM SERPL-SCNC: 4.9 MEQ/L (ref 3.5–5.2)
SODIUM BLD-SCNC: 140 MEQ/L (ref 135–145)

## 2020-04-27 PROCEDURE — 36415 COLL VENOUS BLD VENIPUNCTURE: CPT

## 2020-04-27 PROCEDURE — 80048 BASIC METABOLIC PNL TOTAL CA: CPT

## 2020-05-05 ENCOUNTER — TELEPHONE (OUTPATIENT)
Dept: CARDIOTHORACIC SURGERY | Age: 70
End: 2020-05-05

## 2020-05-05 NOTE — TELEPHONE ENCOUNTER
Patient called into office and states that he had a stress test done recently with Dr. Luz Elena Bull. Patient states that before Dr. Dinora Cerda proceeds with surgery Dr. Luz Elena Bull wishes to speak with him. I will route the information to Dr. Dinora Cerda as he is currently working in the [address] [place] [address].

## 2020-05-06 ENCOUNTER — TELEPHONE (OUTPATIENT)
Dept: CARDIOLOGY CLINIC | Age: 70
End: 2020-05-06

## 2020-05-07 ENCOUNTER — TELEPHONE (OUTPATIENT)
Dept: FAMILY MEDICINE CLINIC | Age: 70
End: 2020-05-07

## 2020-05-08 ENCOUNTER — TELEPHONE (OUTPATIENT)
Dept: CARDIOLOGY CLINIC | Age: 70
End: 2020-05-08

## 2020-05-08 NOTE — TELEPHONE ENCOUNTER
Kelly Guerra called office today and requests that his surgery scheduled for 5/12/20 be cancelled. Patient has expressed his concerns to me, Dr. Lamonte Rausch, and our , Tejal Desai.     Surgery is cancelled per patient's request.

## 2020-05-11 ENCOUNTER — TELEPHONE (OUTPATIENT)
Dept: CARDIOTHORACIC SURGERY | Age: 70
End: 2020-05-11

## 2020-05-11 ENCOUNTER — OFFICE VISIT (OUTPATIENT)
Dept: FAMILY MEDICINE CLINIC | Age: 70
End: 2020-05-11
Payer: MEDICARE

## 2020-05-11 VITALS
BODY MASS INDEX: 31.09 KG/M2 | OXYGEN SATURATION: 98 % | DIASTOLIC BLOOD PRESSURE: 66 MMHG | SYSTOLIC BLOOD PRESSURE: 114 MMHG | WEIGHT: 198.5 LBS | HEART RATE: 73 BPM | RESPIRATION RATE: 14 BRPM | TEMPERATURE: 97.8 F

## 2020-05-11 PROCEDURE — 3017F COLORECTAL CA SCREEN DOC REV: CPT | Performed by: FAMILY MEDICINE

## 2020-05-11 PROCEDURE — 1123F ACP DISCUSS/DSCN MKR DOCD: CPT | Performed by: FAMILY MEDICINE

## 2020-05-11 PROCEDURE — G8427 DOCREV CUR MEDS BY ELIG CLIN: HCPCS | Performed by: FAMILY MEDICINE

## 2020-05-11 PROCEDURE — 1036F TOBACCO NON-USER: CPT | Performed by: FAMILY MEDICINE

## 2020-05-11 PROCEDURE — G8417 CALC BMI ABV UP PARAM F/U: HCPCS | Performed by: FAMILY MEDICINE

## 2020-05-11 PROCEDURE — 4040F PNEUMOC VAC/ADMIN/RCVD: CPT | Performed by: FAMILY MEDICINE

## 2020-05-11 PROCEDURE — 99214 OFFICE O/P EST MOD 30 MIN: CPT | Performed by: FAMILY MEDICINE

## 2020-05-11 ASSESSMENT — ENCOUNTER SYMPTOMS
GASTROINTESTINAL NEGATIVE: 1
RESPIRATORY NEGATIVE: 1
SHORTNESS OF BREATH: 0
CHEST TIGHTNESS: 0
COUGH: 0

## 2020-05-11 NOTE — PATIENT INSTRUCTIONS
You may receive a survey regarding the care you received during your visit. Your input is valuable to us. We encourage you to complete and return your survey. We hope you will choose us in the future for your healthcare needs. Refer to Dr. Nadiya Fischer for medical management of coronary artery disease and second opinion concerning recommendation for bypass surgery. Referral for second opinion concerning thymoma resection will be done later this summer possibly with [facility]. Recheck 3 months.

## 2020-05-11 NOTE — PROGRESS NOTES
Subjective:      Patient ID: Evita James is a 72 y.o. male. HPI  Encounter Diagnoses   Name Primary?  Coronary artery disease due to lipid rich plaque Yes    Essential hypertension     Thymoma      HTN is stable with current medications. Pt has no medication side effects nor orthostatic symptoms. BP Readings from Last 3 Encounters:   05/11/20 114/66   03/18/20 117/71   03/17/20 120/74     The main reason for this appointment is because Celeste Warren has multiple questions concerning the recommendation suddenly for him to undergo coronary artery bypass graft after having an equivocal stress test.  He had already been scheduled for resection of a thymoma and was told by the thoracic surgeon that he was to also undergo coronary artery bypass graft at the same time. Celeste Warren felt that he had been \"cold cocked\" being told this out of the blue and he is interested at this point in getting a second opinion from cardiology which I will set up for him. When he had his cardiac catheterization done, he was told that his coronary artery occlusion was possibly 65 to 70% which I believe could possibly be medically managed rather than calling for surgical intervention. 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 His wife already sees Dr. Nati Clark and he would prefer seeing him also for a second opinion concerning the recommendation for coronary artery bypass graft. Consequently, he is also interested in having a second opinion concerning the resection of the thymoma and will talk to Dr. Nati Clark about a possible referral to the division of thoracic surgery at [facility] based on whether he has a personal recommendation rather than making a blind referral.    The rest of this patient's conditions are stable. Past medical and surgical hx reviewed.   Past Medical History:   Diagnosis Date    Hypertension      Past Surgical History:   Procedure Laterality Date Thymoma             Plan:      Orders Placed This Encounter   Procedures   Sayra Munroe MD, Cardiology, [facility]     Referral Priority:   Routine     Referral Type:   Eval and Treat     Referral Reason:   Specialty Services Required     Referred to Provider:   Shon Moon MD     Requested Specialty:   Cardiology     Number of Visits Requested:   1     There are no discontinued medications. Current Outpatient Medications   Medication Sig Dispense Refill    Coenzyme Q10 (COQ10 PO) Take 1 tablet by mouth 2 times daily      Saw Crowder 450 MG CAPS Take 1 tablet by mouth 2 times daily      vitamin C (ASCORBIC ACID) 500 MG tablet Take 500 mg by mouth 2 times daily      Vitamin E 180 MG CAPS Take 1 capsule by mouth daily      Misc Natural Products (LUTEIN 20 PO) Take 1 tablet by mouth 2 times daily      SUPER B COMPLEX/C PO Take 1 tablet by mouth daily      Cinnamon 500 MG CAPS Take 1,000 mg by mouth daily      atorvastatin (LIPITOR) 40 MG tablet Take 1 tablet by mouth nightly 30 tablet 3    cloNIDine (CATAPRES) 0.2 MG tablet Take 1 tablet by mouth 2 times daily 60 tablet 3    metoprolol tartrate (LOPRESSOR) 50 MG tablet Take 1 tablet by mouth 2 times daily 60 tablet 3    amLODIPine (NORVASC) 5 MG tablet Take 1 tablet by mouth daily (Patient taking differently: Take 2.5 mg by mouth daily ) 30 tablet 3    spironolactone (ALDACTONE) 50 MG tablet Take 1 tablet by mouth daily 30 tablet 3    nitroGLYCERIN (NITROSTAT) 0.4 MG SL tablet Place 1 tablet under the tongue every 5 minutes as needed for Chest pain up to max of 3 total doses.  If no relief after 1 dose, call 911. 25 tablet 3    pantoprazole (PROTONIX) 20 MG tablet Take 1 tablet by mouth daily 30 tablet 3    aspirin 81 MG chewable tablet Take 81 mg by mouth daily      loratadine (CLARITIN) 10 MG capsule Take 10 mg by mouth daily      Misc Natural Products (GLUCOSAMINE CHOND COMPLEX/MSM PO) Take by mouth daily      Omega-3 Fatty Acids

## 2020-05-11 NOTE — PROGRESS NOTES
Visit Information    Have you changed or started any medications since your last visit including any over-the-counter medicines, vitamins, or herbal medicines? no   Are you having any side effects from any of your medications? -  no  Have you stopped taking any of your medications? Is so, why? -  yes - see med list     Have you seen any other physician or provider since your last visit? Yes - Records Requested  Have you had any other diagnostic tests since your last visit? Yes - Records Obtained  Have you been seen in the emergency room and/or had an admission to a hospital since we last saw you? Yes - Records Obtained  Have you had your routine dental cleaning in the past 6 months? no    Have you activated your [employer] account? If not, what are your barriers?  Yes     Patient Care Team:  Naveed Cole MD as PCP - General (Family Medicine)  Naveed Cole MD as PCP - [facility]    Medical History Review  Past Medical, Family, and Social History reviewed and does contribute to the patient presenting condition    Health Maintenance   Topic Date Due    Diabetes screen  01/12/1991    Shingles Vaccine (1 of 2) 01/12/2001    Colon cancer screen colonoscopy  01/12/2001    Pneumococcal 65+ years Vaccine (1 of 1 - PPSV23) 01/12/2016    Annual Wellness Visit (AWV)  05/24/2020    Flu vaccine (Season Ended) 09/01/2020    Lipid screen  03/09/2021    Potassium monitoring  04/27/2021    Creatinine monitoring  04/27/2021    DTaP/Tdap/Td vaccine (2 - Td) 02/26/2029    Hepatitis C screen  Completed    Hepatitis A vaccine  Aged Out    Hepatitis B vaccine  Aged Out    Hib vaccine  Aged Out    Meningococcal (ACWY) vaccine  Aged Out

## 2020-05-19 ENCOUNTER — OFFICE VISIT (OUTPATIENT)
Dept: CARDIOLOGY CLINIC | Age: 70
End: 2020-05-19
Payer: MEDICARE

## 2020-05-19 ENCOUNTER — TELEPHONE (OUTPATIENT)
Dept: CARDIOLOGY CLINIC | Age: 70
End: 2020-05-19

## 2020-05-19 VITALS
HEIGHT: 67 IN | BODY MASS INDEX: 31.48 KG/M2 | WEIGHT: 200.6 LBS | HEART RATE: 64 BPM | DIASTOLIC BLOOD PRESSURE: 78 MMHG | SYSTOLIC BLOOD PRESSURE: 128 MMHG

## 2020-05-19 PROCEDURE — 4040F PNEUMOC VAC/ADMIN/RCVD: CPT | Performed by: NUCLEAR MEDICINE

## 2020-05-19 PROCEDURE — 99204 OFFICE O/P NEW MOD 45 MIN: CPT | Performed by: NUCLEAR MEDICINE

## 2020-05-19 PROCEDURE — G8427 DOCREV CUR MEDS BY ELIG CLIN: HCPCS | Performed by: NUCLEAR MEDICINE

## 2020-05-19 PROCEDURE — G8417 CALC BMI ABV UP PARAM F/U: HCPCS | Performed by: NUCLEAR MEDICINE

## 2020-05-19 PROCEDURE — 1123F ACP DISCUSS/DSCN MKR DOCD: CPT | Performed by: NUCLEAR MEDICINE

## 2020-05-19 PROCEDURE — 3017F COLORECTAL CA SCREEN DOC REV: CPT | Performed by: NUCLEAR MEDICINE

## 2020-05-19 PROCEDURE — 1036F TOBACCO NON-USER: CPT | Performed by: NUCLEAR MEDICINE

## 2020-05-19 ASSESSMENT — ENCOUNTER SYMPTOMS
ANAL BLEEDING: 0
VOMITING: 0
SHORTNESS OF BREATH: 1
BACK PAIN: 0
DIARRHEA: 0
ABDOMINAL DISTENTION: 0
CHEST TIGHTNESS: 0
RECTAL PAIN: 0
BLOOD IN STOOL: 0
PHOTOPHOBIA: 0
COLOR CHANGE: 0
ABDOMINAL PAIN: 0
NAUSEA: 0
CONSTIPATION: 0

## 2020-05-19 NOTE — PROGRESS NOTES
Pt denies CP, SOB, lightheadedness, dizziness, palpitations. Pt has a list of some questions to discuss with Dr. Blank Irwin.

## 2020-05-19 NOTE — PROGRESS NOTES
taking: Reported on 5/11/2020) 90 tablet 3     No current facility-administered medications for this visit. Allergies   Allergen Reactions    Ace Inhibitors Other (See Comments)     cough    Aleve [Naproxen Sodium] Other (See Comments)     Skin peels off    Percocet [Oxycodone-Acetaminophen] Other (See Comments)     High BP  Insomnia  hyper     Health Maintenance   Topic Date Due    Diabetes screen  01/12/1991    Shingles Vaccine (1 of 2) 01/12/2001    Colon cancer screen colonoscopy  01/12/2001    Pneumococcal 65+ years Vaccine (1 of 1 - PPSV23) 01/12/2016    Annual Wellness Visit (AWV)  05/24/2020    Flu vaccine (Season Ended) 09/01/2020    Lipid screen  03/09/2021    Potassium monitoring  04/27/2021    Creatinine monitoring  04/27/2021    DTaP/Tdap/Td vaccine (2 - Td) 02/26/2029    Hepatitis C screen  Completed    Hepatitis A vaccine  Aged Out    Hepatitis B vaccine  Aged Out    Hib vaccine  Aged Out    Meningococcal (ACWY) vaccine  Aged Out       Subjective:  Review of Systems   Constitutional: Positive for fatigue. HENT: Negative for ear discharge and postnasal drip. Eyes: Negative for photophobia. Respiratory: Positive for shortness of breath. Negative for chest tightness. Cardiovascular: Negative for chest pain, palpitations and leg swelling. Gastrointestinal: Negative for abdominal distention, abdominal pain, anal bleeding, blood in stool, constipation, diarrhea, nausea, rectal pain and vomiting. Endocrine: Negative for polyphagia. Genitourinary: Negative for dysuria, frequency and urgency. Musculoskeletal: Negative for arthralgias, back pain, gait problem, joint swelling, myalgias, neck pain and neck stiffness. Skin: Negative for color change, pallor, rash and wound. Neurological: Negative for dizziness, tremors, syncope and light-headedness. Psychiatric/Behavioral: Negative for confusion, decreased concentration, dysphoric mood, hallucinations and self-injury. thymoma  Continue risk factor modification and medical management  Thank you for allowing me to participate in the care of your patient. 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 Please don't hesitate to contact me regarding any further issues related to the patient care    Shante Conner    Orders Placed:  No orders of the defined types were placed in this encounter. Medications Prescribed:  No orders of the defined types were placed in this encounter. Discussed use, benefit, and side effects of prescribed medications. All patient questions answered. Pt voicedunderstanding. Instructed to continue current medications, diet and exercise. Continue risk factor modification and medical management. Patient agreed with treatment plan. Follow up as directed.     Electronically signedby Shante Conner MD on 5/19/2020 at 8:26 AM

## 2020-06-03 ENCOUNTER — TELEPHONE (OUTPATIENT)
Dept: CARDIOLOGY CLINIC | Age: 70
End: 2020-06-03

## 2020-06-03 NOTE — TELEPHONE ENCOUNTER
Patient states Dr. Nati Clark was going to review and do some research on things and call patient after his appointment on 5- ? He is also, asking if he should be taking ASA?  Asking if Dr. Nati Clark agree's with his current medication list?

## 2020-06-15 ENCOUNTER — TELEPHONE (OUTPATIENT)
Dept: CARDIOLOGY CLINIC | Age: 70
End: 2020-06-15

## 2020-06-15 NOTE — TELEPHONE ENCOUNTER
Patient called stating since he got out of the hospital his vision became blurry, so last night he didn't take his atorvastatin, metoprolol or clonidine and he states this morning his vision is clear again. He thinks he is taking too many medications and wants Dr. Kristyn Mays advice.

## 2020-06-15 NOTE — TELEPHONE ENCOUNTER
Pt wasn't asking to drop meds all together   He is asking if he can hold on the night dose of metoprolol and clonidine only? ?

## 2020-06-18 ENCOUNTER — TELEPHONE (OUTPATIENT)
Dept: FAMILY MEDICINE CLINIC | Age: 70
End: 2020-06-18

## 2020-06-18 NOTE — TELEPHONE ENCOUNTER
The patient called in and stated that he is scheduled for a VV on 6/26/20 and has been unable to get his labs done due to the senior center being closed due to Covid. He is requesting orders for the labs he is due for so they can be discussed at his appt. Please advise. The patient will use [facility]. Call the patient with an update.

## 2020-06-26 ENCOUNTER — TELEPHONE (OUTPATIENT)
Dept: FAMILY MEDICINE CLINIC | Age: 70
End: 2020-06-26

## 2020-06-26 NOTE — TELEPHONE ENCOUNTER
He does not need labs yet. He had multiple panels done and March of this year when he had his cardiac work-up.

## 2020-07-09 RX ORDER — PANTOPRAZOLE SODIUM 20 MG/1
20 TABLET, DELAYED RELEASE ORAL DAILY
Qty: 90 TABLET | Refills: 3 | Status: SHIPPED | OUTPATIENT
Start: 2020-07-09 | End: 2020-09-25

## 2020-07-09 RX ORDER — SPIRONOLACTONE 50 MG/1
50 TABLET, FILM COATED ORAL DAILY
Qty: 90 TABLET | Refills: 3 | Status: SHIPPED | OUTPATIENT
Start: 2020-07-09 | End: 2021-04-30 | Stop reason: SDUPTHER

## 2020-07-09 RX ORDER — CLONIDINE HYDROCHLORIDE 0.2 MG/1
0.1 TABLET ORAL 2 TIMES DAILY
Qty: 90 TABLET | Refills: 3 | Status: SHIPPED | OUTPATIENT
Start: 2020-07-09 | End: 2021-04-30 | Stop reason: SDUPTHER

## 2020-07-09 NOTE — TELEPHONE ENCOUNTER
Patient LM on nurse line requesting refills for spironolactone, pantoprazole, and clonidine. LM for patient to call office back. Need to verify doses and pharmacy.

## 2020-07-15 RX ORDER — AMLODIPINE BESYLATE 2.5 MG/1
2.5 TABLET ORAL DAILY
COMMUNITY
End: 2020-07-15 | Stop reason: SDUPTHER

## 2020-07-15 RX ORDER — AMLODIPINE BESYLATE 2.5 MG/1
2.5 TABLET ORAL DAILY
Qty: 90 TABLET | Refills: 3 | Status: SHIPPED | OUTPATIENT
Start: 2020-07-15 | End: 2021-04-30 | Stop reason: SDUPTHER

## 2020-07-16 ENCOUNTER — PATIENT MESSAGE (OUTPATIENT)
Dept: FAMILY MEDICINE CLINIC | Age: 70
End: 2020-07-16

## 2020-07-16 NOTE — TELEPHONE ENCOUNTER
From: Nevaeh Dee  To: Erik Petersen MD  Sent: 7/16/2020 6:20 AM EDT  Subject: Visit Follow-Up Question    Thanks for your patience with me on the phone yesterday. About appointments: when that earlier one didn't work and they called me to set up subsequent, I did not hear about Aug 6 date; I had Aug 20 written down, BUT if the 6th is still available, I would like to use that time slot.

## 2020-08-03 ENCOUNTER — PATIENT MESSAGE (OUTPATIENT)
Dept: CARDIOLOGY CLINIC | Age: 70
End: 2020-08-03

## 2020-08-03 RX ORDER — ALBUTEROL SULFATE 90 UG/1
AEROSOL, METERED RESPIRATORY (INHALATION)
Qty: 18 G | Refills: 3 | Status: SHIPPED | OUTPATIENT
Start: 2020-08-03 | End: 2021-12-07 | Stop reason: SDUPTHER

## 2020-08-04 RX ORDER — METOPROLOL TARTRATE 50 MG/1
50 TABLET, FILM COATED ORAL DAILY
Qty: 90 TABLET | Refills: 3 | Status: SHIPPED | OUTPATIENT
Start: 2020-08-04 | End: 2021-04-30 | Stop reason: SDUPTHER

## 2020-08-04 NOTE — TELEPHONE ENCOUNTER
From: Moe Gates  To: Maria Guadalupe Monaco MD  Sent: 8/3/2020 9:58 PM EDT  Subject: Prescription Question    If Dr Javier Landa wants me to keep taking the Metoprolol, I would like a 90 day supply so I don't have to go to pharmacy so often. My wife is housebound (med problems) and I am working 2 jobs, so . . . Angelica Wynn

## 2020-08-06 ENCOUNTER — TELEMEDICINE (OUTPATIENT)
Dept: FAMILY MEDICINE CLINIC | Age: 70
End: 2020-08-06
Payer: MEDICARE

## 2020-08-06 PROCEDURE — G0439 PPPS, SUBSEQ VISIT: HCPCS | Performed by: FAMILY MEDICINE

## 2020-08-06 PROCEDURE — 1123F ACP DISCUSS/DSCN MKR DOCD: CPT | Performed by: FAMILY MEDICINE

## 2020-08-06 PROCEDURE — 4040F PNEUMOC VAC/ADMIN/RCVD: CPT | Performed by: FAMILY MEDICINE

## 2020-08-06 PROCEDURE — 3017F COLORECTAL CA SCREEN DOC REV: CPT | Performed by: FAMILY MEDICINE

## 2020-08-06 NOTE — PROGRESS NOTES
tablet by mouth 2 times daily  Historical Provider, MD   SUPER B COMPLEX/C PO Take 1 tablet by mouth daily  Historical Provider, MD   Cinnamon 500 MG CAPS Take 1,000 mg by mouth daily  Historical Provider, MD   atorvastatin (LIPITOR) 40 MG tablet Take 1 tablet by mouth nightly  Camille Newman MD   nitroGLYCERIN (NITROSTAT) 0.4 MG SL tablet Place 1 tablet under the tongue every 5 minutes as needed for Chest pain up to max of 3 total doses. If no relief after 1 dose, call 911.   Camille Newman MD   aspirin 81 MG chewable tablet Take 81 mg by mouth daily  Historical Provider, MD   loratadine (CLARITIN) 10 MG capsule Take 10 mg by mouth daily  Historical Provider, MD   hydrochlorothiazide (HYDRODIURIL) 25 MG tablet Take 1 tablet by mouth daily  Patient not taking: Reported on 5/11/2020  Jacque Washington MD   Misc Natural Products (Genevive Carota COMPLEX/MSM PO) Take by mouth daily  Historical Provider, MD   Omega-3 Fatty Acids (FISH OIL PO) Take by mouth daily  Historical Provider, MD   Multiple Vitamins-Minerals (MULTIVITAMIN PO) Take by mouth daily  Historical Provider, MD   Cholecalciferol (VITAMIN D) 2000 units CAPS capsule Take 4,000 Units by mouth daily  Historical Provider, MD         Past Medical History:   Diagnosis Date    Hypertension        Past Surgical History:   Procedure Laterality Date    EGD  2020   [address]  5/30/12    [facility]    [address]  2-4-14    Repair recurrent incarcerated left inguinal hernia with mesh    MOUTH SURGERY      several years ago-removed 2-3 back teeth-wisdom teeth    UPPER GASTROINTESTINAL ENDOSCOPY N/A 3/10/2020    EGD BIOPSY performed by Ibrahima Grant MD at [facility]         Family History   Problem Relation Age of Onset    Alzheimer's Disease Mother     High Blood Pressure Mother     Cancer Father         prostate    Cancer Sister         breast cancer-twin    Stomach Cancer Paternal Grandmother     Colon Cancer Neg Hx     Esophageal Cancer Neg Hx     Liver Cancer Neg Hx     Rectal Cancer Neg Hx        CareTeam (Including outside providers/suppliers regularly involved in providing care):   Patient Care Team:  Geo Rivera MD as PCP - General (Family Medicine)  Geo Rivera MD as PCP - [facility] Empaneled Provider    Wt Readings from Last 3 Encounters:   05/19/20 200 lb 9.6 oz (91 kg)   05/11/20 198 lb 8 oz (90 kg)   03/18/20 195 lb (88.5 kg)     Patient-Reported Vitals 8/5/2020   Patient-Reported Weight 191   Patient-Reported Height 5'5\"   Patient-Reported Systolic 013   Patient-Reported Diastolic 71   Patient-Reported Pulse 52   Patient-Reported Temperature 98.2   Patient-Reported SpO2 95      There is no height or weight on file to calculate BMI. Based upon direct observation of the patient, evaluation of cognition reveals recent and remote memory intact.     General Appearance: alert and oriented to person, place and time, well developed and well- nourished, in no acute distress  Skin: warm and dry, no rash or erythema  Head: normocephalic and atraumatic  Eyes: pupils equal, round, and reactive to light, extraocular eye movements intact, conjunctivae normal  ENT: tympanic membrane, external ear and ear canal normal bilaterally, nose without deformity, nasal mucosa and turbinates normal without polyps  Neck: supple and non-tender without mass, no thyromegaly or thyroid nodules, no cervical lymphadenopathy  Pulmonary/Chest: clear to auscultation bilaterally- no wheezes, rales or rhonchi, normal air movement, no respiratory distress  Cardiovascular: normal rate, regular rhythm, normal S1 and S2, no murmurs, rubs, clicks, or gallops, distal pulses intact, no carotid bruits  Abdomen: soft, non-tender, non-distended, normal bowel sounds, no masses or organomegaly  Extremities: no cyanosis, clubbing or edema  Musculoskeletal: normal range of motion, no joint swelling, deformity or tenderness  Neurologic: reflexes normal and symmetric, no cranial for Preventive Services Due: see orders and patient instructions/AVS.  . Recommended screening schedule for the next 5-10 years is provided to the patient in written form: see Patient Instructions/AVS.    Connor Johnson was seen today for medicare awv. Diagnoses and all orders for this visit:    Essential hypertension    RAD (reactive airway disease), mild intermittent, uncomplicated    Coronary artery disease due to lipid rich plaque    Routine general medical examination at a health care facility              Erika Mariee is a 72 y.o. male being evaluated by a Virtual Visit (video and audio) encounter to address concerns as mentioned above. A caregiver was present when appropriate. Due to this being a TeleHealth encounter ([facility] public health emergency), evaluation of the following organ systems was limited: Vitals/Constitutional/EENT/Resp/CV/GI//MS/Neuro/Skin/Heme-Lymph-Imm. Pursuant to the emergency declaration under the [address] [place] [address], [address] [place] [address] and the [employer] Act, this Virtual Visit was conducted with patient's (and/or legal guardian's) consent, to reduce the patient's risk of exposure to COVID-19 and provide necessary medical care. The patient (and/or legal guardian) has also been advised to contact this office for worsening conditions or problems, and seek emergency medical treatment and/or call 911 if deemed necessary. Patient identification was verified at the start of the visit: Yes    Services were provided through a video synchronous discussion virtually to substitute for in-person clinic visit. Patient and provider were located at their individual homes. --Eulalia Poole MD on 8/6/2020 at 3:39 PM    An electronic signature was used to authenticate this note.

## 2020-09-14 ENCOUNTER — TELEPHONE (OUTPATIENT)
Dept: CARDIOLOGY CLINIC | Age: 70
End: 2020-09-14

## 2020-09-14 NOTE — TELEPHONE ENCOUNTER
Dr Merry Lorenzo pt asking if he can try something other than atorvastatin as it is gving him blurry vision. Is there a different statin drug I can try? Atorvastatin makes my vision blurry. I have experimented many ways,  (reducing dosage,  taking at different times, not take at all, etc.)  and am certain it is the culprit. 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 Have stayed off that drug for a week now, and have not had the problem. Any suggestions?

## 2020-09-14 NOTE — TELEPHONE ENCOUNTER
Per keesha, pt states has not heard from dr Jairo Zaldivar office (referral in June)    call to dr Jairo Zaldivar (per internet)  ([phone], opt 5 -ph)   Left msg for their office to call us regarding referral    Records faxed to their office ([phone])  Records did not go through [facility] remote fax\"

## 2020-09-14 NOTE — TELEPHONE ENCOUNTER
Call to dr Shannan freeman nurse  States pt would need to see a thoracic surgeon    Dr Oneal Dooley, [address] group  [phone]  Called to parveen at dr Oneal Dooley office, faxed records, they will review and contact pt. ([phone])    Pt informed give about 2 wks, if haven't heard again, contact our office  Pt states not worried about it, has an appt with dr Sandra Ly 09-25-20, will let us know then if heard from their office or not.

## 2020-09-15 RX ORDER — PRAVASTATIN SODIUM 40 MG
40 TABLET ORAL DAILY
Qty: 30 TABLET | Refills: 0 | Status: SHIPPED | OUTPATIENT
Start: 2020-09-15 | End: 2020-11-23 | Stop reason: SDUPTHER

## 2020-09-25 ENCOUNTER — OFFICE VISIT (OUTPATIENT)
Dept: CARDIOLOGY CLINIC | Age: 70
End: 2020-09-25
Payer: MEDICARE

## 2020-09-25 ENCOUNTER — TELEPHONE (OUTPATIENT)
Dept: CARDIOLOGY CLINIC | Age: 70
End: 2020-09-25

## 2020-09-25 VITALS
SYSTOLIC BLOOD PRESSURE: 120 MMHG | HEART RATE: 65 BPM | BODY MASS INDEX: 29.84 KG/M2 | WEIGHT: 190.1 LBS | HEIGHT: 67 IN | DIASTOLIC BLOOD PRESSURE: 60 MMHG

## 2020-09-25 PROCEDURE — 4040F PNEUMOC VAC/ADMIN/RCVD: CPT | Performed by: NUCLEAR MEDICINE

## 2020-09-25 PROCEDURE — 3017F COLORECTAL CA SCREEN DOC REV: CPT | Performed by: NUCLEAR MEDICINE

## 2020-09-25 PROCEDURE — G8427 DOCREV CUR MEDS BY ELIG CLIN: HCPCS | Performed by: NUCLEAR MEDICINE

## 2020-09-25 PROCEDURE — 99214 OFFICE O/P EST MOD 30 MIN: CPT | Performed by: NUCLEAR MEDICINE

## 2020-09-25 PROCEDURE — 1036F TOBACCO NON-USER: CPT | Performed by: NUCLEAR MEDICINE

## 2020-09-25 PROCEDURE — 1123F ACP DISCUSS/DSCN MKR DOCD: CPT | Performed by: NUCLEAR MEDICINE

## 2020-09-25 PROCEDURE — G8417 CALC BMI ABV UP PARAM F/U: HCPCS | Performed by: NUCLEAR MEDICINE

## 2020-09-25 NOTE — PROGRESS NOTES
 amLODIPine (NORVASC) 2.5 MG tablet Take 1 tablet by mouth daily 90 tablet 3    cloNIDine (CATAPRES) 0.2 MG tablet Take 0.5 tablets by mouth 2 times daily 90 tablet 3    spironolactone (ALDACTONE) 50 MG tablet Take 1 tablet by mouth daily 90 tablet 3    Coenzyme Q10 (COQ10 PO) Take 1 tablet by mouth 2 times daily      Saw [place] 450 MG CAPS Take 1 tablet by mouth 2 times daily      vitamin C (ASCORBIC ACID) 500 MG tablet Take 500 mg by mouth 2 times daily      Vitamin E 180 MG CAPS Take 1 capsule by mouth daily      Potassium 99 MG TABS Take 1 tablet by mouth daily      Misc Natural Products (LUTEIN 20 PO) Take 1 tablet by mouth 2 times daily      SUPER B COMPLEX/C PO Take 1 tablet by mouth daily      Cinnamon 500 MG CAPS Take 1,000 mg by mouth daily      nitroGLYCERIN (NITROSTAT) 0.4 MG SL tablet Place 1 tablet under the tongue every 5 minutes as needed for Chest pain up to max of 3 total doses. If no relief after 1 dose, call 911. 25 tablet 3    aspirin 81 MG chewable tablet Take 81 mg by mouth daily      loratadine (CLARITIN) 10 MG capsule Take 10 mg by mouth daily      Misc Natural Products (GLUCOSAMINE CHOND COMPLEX/MSM PO) Take by mouth daily      Omega-3 Fatty Acids (FISH OIL PO) Take by mouth daily      Multiple Vitamins-Minerals (MULTIVITAMIN PO) Take by mouth daily      Cholecalciferol (VITAMIN D) 2000 units CAPS capsule Take 4,000 Units by mouth daily      pravastatin (PRAVACHOL) 40 MG tablet Take 1 tablet by mouth daily (Patient not taking: Reported on 9/25/2020) 30 tablet 0    hydrochlorothiazide (HYDRODIURIL) 25 MG tablet Take 1 tablet by mouth daily (Patient not taking: Reported on 5/11/2020) 90 tablet 3     No current facility-administered medications for this visit.       Allergies   Allergen Reactions    Ace Inhibitors Other (See Comments)     cough    Aleve [Naproxen Sodium] Other (See Comments)     Skin peels off    Percocet [Oxycodone-Acetaminophen] Other (See Comments) High BP  Insomnia  hyper     Health Maintenance   Topic Date Due    Diabetes screen  01/12/1991    Shingles Vaccine (1 of 2) 01/12/2001    Colon cancer screen colonoscopy  01/12/2001    Pneumococcal 65+ years Vaccine (1 of 1 - PPSV23) 01/12/2016    Flu vaccine (1) 09/01/2020    Lipid screen  03/09/2021    Potassium monitoring  04/27/2021    Creatinine monitoring  04/27/2021    Annual Wellness Visit (AWV)  08/07/2021    Statin Therapy  09/15/2021    DTaP/Tdap/Td vaccine (2 - Td) 02/26/2029    Hepatitis C screen  Completed    Hepatitis A vaccine  Aged Out    Hepatitis B vaccine  Aged Out    Hib vaccine  Aged Out    Meningococcal (ACWY) vaccine  Aged Out       Subjective:  Review of Systems  General:   No fever, no chills, some fatigue or weight loss  Pulmonary:    some dyspnea, no wheezing  Cardiac:    Denies recent chest pain,   GI:     No nausea or vomiting, no abdominal pain  Neuro:     No dizziness or light headedness,   Musculoskeletal:  No recent active issues  Extremities:   No edema, no obvious claudication       Objective:  Physical Exam  /60   Pulse 65   Ht 5' 7\" (1.702 m)   Wt 190 lb 1.6 oz (86.2 kg)   BMI 29.77 kg/m²   General:   Well developed, well nourished  Lungs:    Clear to auscultation  Heart:    Normal S1 S2, Slight murmur. no rubs, no gallops  Abdomen:   Soft, non tender, no organomegalies, positive bowel sounds  Extremities:   No edema, no cyanosis, good peripheral pulses  Neurological:   Awake, alert, oriented. No obvious focal deficits  Musculoskelatal:  No obvious deformities    Assessment:      Diagnosis Orders   1. Coronary artery disease involving native coronary artery of native heart without angina pectoris     2. Essential hypertension     3. Familial hypercholesterolemia     as above  Pending the decision on the lung mass  Cardiac seems stable  does have LAD disease  Does have mass in the lungs     Plan:  No follow-ups on file.   Discussed at length Discussed statins lower dose  Discussed Repatha   Refer to [facility] for opinion   Continue risk factor modification and medical management  Thank you for allowing me to participate in the care of your patient. Please don't hesitate to contact me regarding any further issues related to the patient care      Orders Placed:  No orders of the defined types were placed in this encounter. Medications Prescribed:  No orders of the defined types were placed in this encounter. Discussed use, benefit, and side effects of prescribed medications. All patient questions answered. Pt voicedunderstanding. Instructed to continue current medications, diet and exercise. Continue risk factor modification and medical management. Patient agreed with treatment plan. Follow up as directed.     Electronically signedby Tory Raymundo MD on 9/25/2020 at 12:09 PM

## 2020-09-25 NOTE — TELEPHONE ENCOUNTER
Please see My Chart message:     Eric Salas  To   [facility] Clinical Support Pool  Sent   9/25/2020 10:03 AM    I never have heartburn or reflux so quit taking the pantoprazole . concerning statins.   seemed ok at first, but Tue morning of this week, vision was very blurry.  Have not taken since,  and have not had the problem again. Did receive call from [facility].  set up appt.  but at end of call asked for Dr Name, and specialty. [address] [address] surgeon.   What?   Not another surgeon ! Nichole Evangelista called back and canceled!   What happened to getting another test and then recommendation from (i don't know what kind of specialist). 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

## 2020-10-07 ENCOUNTER — OFFICE VISIT (OUTPATIENT)
Dept: ONCOLOGY | Age: 70
End: 2020-10-07
Payer: MEDICARE

## 2020-10-07 ENCOUNTER — HOSPITAL ENCOUNTER (OUTPATIENT)
Dept: INFUSION THERAPY | Age: 70
Discharge: HOME | End: 2020-10-07
Payer: MEDICARE

## 2020-10-07 VITALS
DIASTOLIC BLOOD PRESSURE: 69 MMHG | SYSTOLIC BLOOD PRESSURE: 143 MMHG | TEMPERATURE: 97.7 F | HEIGHT: 67 IN | RESPIRATION RATE: 18 BRPM | WEIGHT: 190.8 LBS | BODY MASS INDEX: 29.95 KG/M2 | HEART RATE: 57 BPM | OXYGEN SATURATION: 96 %

## 2020-10-07 DIAGNOSIS — R97.20 ELEVATED PROSTATE SPECIFIC ANTIGEN (PSA): ICD-10-CM

## 2020-10-07 DIAGNOSIS — J98.59 MEDIASTINAL MASS: ICD-10-CM

## 2020-10-07 PROCEDURE — 36415 COLL VENOUS BLD VENIPUNCTURE: CPT

## 2020-10-07 PROCEDURE — G8484 FLU IMMUNIZE NO ADMIN: HCPCS | Performed by: INTERNAL MEDICINE

## 2020-10-07 PROCEDURE — G8417 CALC BMI ABV UP PARAM F/U: HCPCS | Performed by: INTERNAL MEDICINE

## 2020-10-07 PROCEDURE — G8427 DOCREV CUR MEDS BY ELIG CLIN: HCPCS | Performed by: INTERNAL MEDICINE

## 2020-10-07 PROCEDURE — 84153 ASSAY OF PSA TOTAL: CPT

## 2020-10-07 PROCEDURE — 99211 OFF/OP EST MAY X REQ PHY/QHP: CPT

## 2020-10-07 PROCEDURE — 1036F TOBACCO NON-USER: CPT | Performed by: INTERNAL MEDICINE

## 2020-10-07 PROCEDURE — 3017F COLORECTAL CA SCREEN DOC REV: CPT | Performed by: INTERNAL MEDICINE

## 2020-10-07 PROCEDURE — 4040F PNEUMOC VAC/ADMIN/RCVD: CPT | Performed by: INTERNAL MEDICINE

## 2020-10-07 PROCEDURE — 99205 OFFICE O/P NEW HI 60 MIN: CPT | Performed by: INTERNAL MEDICINE

## 2020-10-07 PROCEDURE — 1123F ACP DISCUSS/DSCN MKR DOCD: CPT | Performed by: INTERNAL MEDICINE

## 2020-10-08 LAB — PROSTATE SPECIFIC ANTIGEN: 3.63 NG/ML (ref 0–1)

## 2020-10-09 NOTE — PROGRESS NOTES
[facility] CANCER CENTER  CANCER NETWORK OF [facility]  ONCOLOGY SPECIALISTS OF [place] [address] [address], [facility] [address] [facility] [address]  Dept: [phone]  Dept Fax: [phone]: [phone]     Referring Physician:  Dr Roman Cassidy    Subjective:      Chief Complaint:  Leonel Chen is a 72 y.o. with a mediastinal mass. HPI:  This is the first visit to the [facility] for this patient who was referred for evaluation of a mediastinal mass. The patient is a healthy 64-year-old  male who has a history of hypertension. In early March 2020 the patient developed chest pain. He presented to the emergency room and there was a concern about the possibility of a dissecting aneurysm. Therefore a CTA of the chest was completed on March 8, 2020. This did not find evidence of a dissecting aneurysm but it did find a 5.0 cm x 3.3 cm x 2.9 cm lobulated heterogeneously enhancing mass in the anterior mediastinum. The most likely differential was a thymoma or a lymphoma. There were no infiltrates or effusions noted within the chest.  In addition there was no mediastinal, hilar, or axillary lymphadenopathy identified. Of note, a CT of the abdomen was unremarkable. As follow-up of the mediastinal mass a PET/CT scan was completed on 16th 2020. This confirmed a mildly FDG avid anterior mediastinal mass suggesting a slow-growing neoplasm. There was no evidence of distant metastatic disease. The patient met with Dr. Beto Henriquez from cardiothoracic surgery. Dr. Beto Henriquez recommended surgical resection of the lesion as it likely represented a thymoma. However, due to the COVID-19 restriction surgery was not immediately scheduled. The patient has had no further follow-up. Genet Perry currently has no signs or symptoms associated with this mass. He denies having chest pain, chest pressure, shortness of breath, or other pulmonary symptoms.   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 Came to our clinic today for medical oncology consultation regarding further evaluation and therapy for the mediastinal mass. Past Medical History  He  has a past medical history of Hypertension. Surgical History  He  has a past surgical history that includes Mouth surgery; hernia repair (5/30/12); hernia repair (2-4-14); Upper gastrointestinal endoscopy (N/A, 3/10/2020); and EGD (2020). Home Medications  He has a current medication list which includes the following prescription(s): pravastatin, metoprolol tartrate, albuterol sulfate hfa, amlodipine, clonidine, spironolactone, coenzyme q10, saw palmetto, vitamin c, vitamin e, misc natural products, b complex-c, cinnamon, nitroglycerin, aspirin, loratadine, hydrochlorothiazide, misc natural products, omega-3 fatty acids, multiple vitamin, vitamin d, and potassium. Allergies  Allergies   Allergen Reactions    Ace Inhibitors Other (See Comments)     cough    Aleve [Naproxen Sodium] Other (See Comments)     Skin peels off    Percocet [Oxycodone-Acetaminophen] Other (See Comments)     High BP  Insomnia  hyper     Social History  He  reports that he has never smoked. He has never used smokeless tobacco. He reports that he does not drink alcohol or use drugs. Family History  His family history includes Alzheimer's Disease in his mother; Cancer in his father and sister; High Blood Pressure in his mother; Stomach Cancer in his paternal grandmother. Review of Systems  Constitutional: Negative. HENT: Negative. Eyes: Negative. Respiratory: Negative. Cardiovascular: Negative. Gastrointestinal: Negative. Genitourinary: Negative. Musculoskeletal: Negative. Skin: Negative. Neurological: Negative. Hematological: Negative. Psychiatric/Behavioral: Negative.       Objective:   Physical Exam  Vitals:    10/07/20 1457   BP: (!) 143/69   Pulse: 57   Resp: 18   Temp: 97.7 °F (36.5 °C)   SpO2: 96%   Vitals reviewed and are stable. Constitutional: Well-developed and well-nourished. No acute distress. HENT: Normocephalic and atraumatic. Eyes: Pupils are equal and reactive. No scleral icterus. Neck: Overall appearance is symmetrical. No identifiable masses. Chest: Inspection and palpation of chest is normal.  Pulmonary: Effort normal. No respiratory distress. Cardiovascular: RRR. No edema in any of the four extremities. Abdominal: Soft. No hepatomegaly or splenomegaly. Musculoskeletal: Gait is normal. Muscle strength and tone good. Neurological: Alert and oriented to person, place, and time. Judgment and thought content normal.  Skin: Skin is warm and dry. No rash. Psychiatric: Mood and affect appropriate for the clinical situation. Behavior is normal.      Assessment:   1. 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 Plan:   I had a 60-minute consultation with the patient today. The majority this time was spent in direct face-to-face discussion regarding the results of the CT scan, results of the PET/CT scan, the potential implications of the findings of the mediastinal mass, and further therapeutic options. I did inform the patient that the radiographic studies are most consistent with a thymoma although a low-grade neoplasm such as lymphoma cannot be entirely excluded. We did discuss that the standard of care of treatment of thymoma is surgical resection. Biopsy is not indicated and could be potentially harmful to the patient. We did discuss that the long-term complications of not proceeding with intervention for a thymoma could be localized extension of the mass into adjacent structures or the possibility of underlying carcinoma that could develop metastatic spread. 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 Multiple questions were answered throughout the course of the consultation. We have elected up repeat radiographic studies to evaluate the status of the mediastinal mass since has been nearly seven months since his last scans. The patient will return to the medical oncology clinic to review the results of the scans and make further plans of care once these of been completed. The patient will still continue to consider surgical intervention for what appears to be a probable thymoma. Ellamae Burkitt M.D. Medical Director: [facility]  Cancer [address] [employer] [facility] [place], [place], [address] [place] [address], [address] [place] [address], [address] [place] [address] of the [facility] at the [facility]      **This report has been created using voice recognition software. It may contain minor errors which are inherent in voice recognition technology. **

## 2020-10-17 ENCOUNTER — HOSPITAL ENCOUNTER (OUTPATIENT)
Dept: CT IMAGING | Age: 70
Discharge: HOME | End: 2020-10-17
Payer: MEDICARE

## 2020-10-17 LAB — POC CREATININE WHOLE BLOOD: 0.9 MG/DL (ref 0.5–1.2)

## 2020-10-17 PROCEDURE — 71260 CT THORAX DX C+: CPT

## 2020-10-17 PROCEDURE — 82565 ASSAY OF CREATININE: CPT

## 2020-10-17 PROCEDURE — 6360000004 HC RX CONTRAST MEDICATION: Performed by: INTERNAL MEDICINE

## 2020-10-17 RX ADMIN — IOPAMIDOL 85 ML: 755 INJECTION, SOLUTION INTRAVENOUS at 07:54

## 2020-10-19 ENCOUNTER — TELEPHONE (OUTPATIENT)
Dept: CARDIOLOGY CLINIC | Age: 70
End: 2020-10-19

## 2020-10-19 NOTE — TELEPHONE ENCOUNTER
Miko Hernandez  To   [facility] Clinical Support Pool  Sent   10/18/2020 12:27 AM    comment, not a question.  Getting along much better w the new statin; the blurriness has cleared up completely.   a note:  instead of the wham of the entire dose every other sher, I cut them in half and take half each evening;  seem to get along well with it. another note:  blood pressure is staying down.  I check it 2 - 3 times a week, and top number is averaging between 111 - 118;  sometimes much lower.  On Oct 2  it was 105/69  & pulse 56.   On Oct 13 it was 105/65  & pulse 45.

## 2020-10-21 ENCOUNTER — APPOINTMENT (OUTPATIENT)
Dept: GENERAL RADIOLOGY | Age: 70
End: 2020-10-21
Payer: COMMERCIAL

## 2020-10-21 ENCOUNTER — APPOINTMENT (OUTPATIENT)
Dept: CT IMAGING | Age: 70
End: 2020-10-21
Payer: COMMERCIAL

## 2020-10-21 ENCOUNTER — HOSPITAL ENCOUNTER (EMERGENCY)
Age: 70
Discharge: HOME | End: 2020-10-21
Attending: EMERGENCY MEDICINE
Payer: COMMERCIAL

## 2020-10-21 VITALS
SYSTOLIC BLOOD PRESSURE: 118 MMHG | RESPIRATION RATE: 15 BRPM | TEMPERATURE: 98.2 F | BODY MASS INDEX: 29.82 KG/M2 | HEIGHT: 67 IN | DIASTOLIC BLOOD PRESSURE: 74 MMHG | OXYGEN SATURATION: 92 % | HEART RATE: 96 BPM | WEIGHT: 190 LBS

## 2020-10-21 LAB
ALBUMIN SERPL-MCNC: 4.3 G/DL (ref 3.5–5.1)
ALP BLD-CCNC: 56 U/L (ref 38–126)
ALT SERPL-CCNC: 29 U/L (ref 11–66)
ANION GAP SERPL CALCULATED.3IONS-SCNC: 12 MEQ/L (ref 8–16)
APTT: 28.2 SECONDS (ref 22–38)
AST SERPL-CCNC: 26 U/L (ref 5–40)
BASOPHILS # BLD: 0.4 %
BASOPHILS ABSOLUTE: 0 THOU/MM3 (ref 0–0.1)
BILIRUB SERPL-MCNC: 0.3 MG/DL (ref 0.3–1.2)
BILIRUBIN URINE: NEGATIVE
BLOOD, URINE: NEGATIVE
BUN BLDV-MCNC: 38 MG/DL (ref 7–22)
CALCIUM SERPL-MCNC: 9.8 MG/DL (ref 8.5–10.5)
CHARACTER, URINE: CLEAR
CHLORIDE BLD-SCNC: 106 MEQ/L (ref 98–111)
CO2: 23 MEQ/L (ref 23–33)
COLOR: YELLOW
CREAT SERPL-MCNC: 0.9 MG/DL (ref 0.4–1.2)
EKG ATRIAL RATE: 90 BPM
EKG P AXIS: 51 DEGREES
EKG P-R INTERVAL: 174 MS
EKG Q-T INTERVAL: 368 MS
EKG QRS DURATION: 110 MS
EKG QTC CALCULATION (BAZETT): 450 MS
EKG R AXIS: -33 DEGREES
EKG T AXIS: 62 DEGREES
EKG VENTRICULAR RATE: 90 BPM
EOSINOPHIL # BLD: 1.6 %
EOSINOPHILS ABSOLUTE: 0.1 THOU/MM3 (ref 0–0.4)
ERYTHROCYTE [DISTWIDTH] IN BLOOD BY AUTOMATED COUNT: 12 % (ref 11.5–14.5)
ERYTHROCYTE [DISTWIDTH] IN BLOOD BY AUTOMATED COUNT: 41.6 FL (ref 35–45)
ETHYL ALCOHOL, SERUM: < 0.01 %
GFR SERPL CREATININE-BSD FRML MDRD: 83 ML/MIN/1.73M2
GLUCOSE BLD-MCNC: 100 MG/DL (ref 70–108)
GLUCOSE, URINE: NEGATIVE MG/DL
HCT VFR BLD CALC: 45.8 % (ref 42–52)
HEMOGLOBIN: 15.3 GM/DL (ref 14–18)
IMMATURE GRANS (ABS): 0.02 THOU/MM3 (ref 0–0.07)
IMMATURE GRANULOCYTES: 0.2 %
INR BLD: 1.13 (ref 0.85–1.13)
KETONES, URINE: NEGATIVE
LEUKOCYTE EST, POC: NEGATIVE
LYMPHOCYTES # BLD: 20.5 %
LYMPHOCYTES ABSOLUTE: 1.7 THOU/MM3 (ref 1–4.8)
MCH RBC QN AUTO: 31.2 PG (ref 26–33)
MCHC RBC AUTO-ENTMCNC: 33.4 GM/DL (ref 32.2–35.5)
MCV RBC AUTO: 93.5 FL (ref 80–94)
MONOCYTES # BLD: 9.5 %
MONOCYTES ABSOLUTE: 0.8 THOU/MM3 (ref 0.4–1.3)
NITRITE, URINE: NEGATIVE
NUCLEATED RED BLOOD CELLS: 0 /100 WBC
OSMOLALITY CALCULATION: 290.4 MOSMOL/KG (ref 275–300)
PH UA: 5.5 (ref 5–9)
PLATELET # BLD: 203 THOU/MM3 (ref 130–400)
PMV BLD AUTO: 12.1 FL (ref 9.4–12.4)
POTASSIUM SERPL-SCNC: 4 MEQ/L (ref 3.5–5.2)
PROTEIN UA: NEGATIVE MG/DL
RBC # BLD: 4.9 MILL/MM3 (ref 4.7–6.1)
SEG NEUTROPHILS: 67.8 %
SEGMENTED NEUTROPHILS ABSOLUTE COUNT: 5.5 THOU/MM3 (ref 1.8–7.7)
SODIUM BLD-SCNC: 141 MEQ/L (ref 135–145)
SPECIFIC GRAVITY UA: 1.02 (ref 1–1.03)
TOTAL PROTEIN: 6.9 G/DL (ref 6.1–8)
UROBILINOGEN, URINE: 0.2 EU/DL (ref 0–1)
WBC # BLD: 8.1 THOU/MM3 (ref 4.8–10.8)

## 2020-10-21 PROCEDURE — 85610 PROTHROMBIN TIME: CPT

## 2020-10-21 PROCEDURE — 93005 ELECTROCARDIOGRAM TRACING: CPT | Performed by: EMERGENCY MEDICINE

## 2020-10-21 PROCEDURE — 71045 X-RAY EXAM CHEST 1 VIEW: CPT

## 2020-10-21 PROCEDURE — 81003 URINALYSIS AUTO W/O SCOPE: CPT

## 2020-10-21 PROCEDURE — APPSS180 APP SPLIT SHARED TIME > 60 MINUTES: Performed by: PHYSICIAN ASSISTANT

## 2020-10-21 PROCEDURE — G0480 DRUG TEST DEF 1-7 CLASSES: HCPCS

## 2020-10-21 PROCEDURE — 99285 EMERGENCY DEPT VISIT HI MDM: CPT

## 2020-10-21 PROCEDURE — 93010 ELECTROCARDIOGRAM REPORT: CPT | Performed by: NUCLEAR MEDICINE

## 2020-10-21 PROCEDURE — 85025 COMPLETE CBC W/AUTO DIFF WBC: CPT

## 2020-10-21 PROCEDURE — 72125 CT NECK SPINE W/O DYE: CPT

## 2020-10-21 PROCEDURE — 6820000002 HC L2 INJURY CALL ACTIVATION: Performed by: SURGERY

## 2020-10-21 PROCEDURE — 85730 THROMBOPLASTIN TIME PARTIAL: CPT

## 2020-10-21 PROCEDURE — 36415 COLL VENOUS BLD VENIPUNCTURE: CPT

## 2020-10-21 PROCEDURE — 70450 CT HEAD/BRAIN W/O DYE: CPT

## 2020-10-21 PROCEDURE — 80053 COMPREHEN METABOLIC PANEL: CPT

## 2020-10-21 ASSESSMENT — ENCOUNTER SYMPTOMS
COUGH: 0
VOMITING: 0
EYE REDNESS: 0
WHEEZING: 0
CHEST TIGHTNESS: 0
NAUSEA: 0
PHOTOPHOBIA: 0
ROS SKIN COMMENTS: ABRASION TO POSTERIOR SCALP
FACIAL SWELLING: 0
SHORTNESS OF BREATH: 0
ABDOMINAL PAIN: 0
BACK PAIN: 0
DIARRHEA: 0
RHINORRHEA: 0
SORE THROAT: 0
EYE DISCHARGE: 0
EYE PAIN: 0
STRIDOR: 0
EYE ITCHING: 0
ABDOMINAL DISTENTION: 0
CONSTIPATION: 0
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
## 2020-10-21 NOTE — ED NOTES
Bed: 004A  Expected date:   Expected time:   [facility]  Comments:     Brenda Sher RN  10/21/20 9500

## 2020-10-21 NOTE — ED NOTES
Patient returns from radiology per this RN. Patient attempting to urinate at this time.       Artemus Osgood, RN  10/21/20 Angel Luis Holguin

## 2020-10-22 ENCOUNTER — TELEPHONE (OUTPATIENT)
Dept: FAMILY MEDICINE CLINIC | Age: 70
End: 2020-10-22

## 2020-10-22 NOTE — ED NOTES
Patient assisted to stand at bedside to urinate. Tolerates well.        Kemp Alpers, RN  10/21/20 2003
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
## 2020-10-29 ENCOUNTER — OFFICE VISIT (OUTPATIENT)
Dept: FAMILY MEDICINE CLINIC | Age: 70
End: 2020-10-29
Payer: MEDICARE

## 2020-10-29 VITALS
SYSTOLIC BLOOD PRESSURE: 134 MMHG | DIASTOLIC BLOOD PRESSURE: 66 MMHG | TEMPERATURE: 97.5 F | RESPIRATION RATE: 18 BRPM | HEART RATE: 52 BPM | BODY MASS INDEX: 29.51 KG/M2 | WEIGHT: 188.4 LBS

## 2020-10-29 PROCEDURE — 3017F COLORECTAL CA SCREEN DOC REV: CPT | Performed by: FAMILY MEDICINE

## 2020-10-29 PROCEDURE — G8484 FLU IMMUNIZE NO ADMIN: HCPCS | Performed by: FAMILY MEDICINE

## 2020-10-29 PROCEDURE — 1036F TOBACCO NON-USER: CPT | Performed by: FAMILY MEDICINE

## 2020-10-29 PROCEDURE — 4040F PNEUMOC VAC/ADMIN/RCVD: CPT | Performed by: FAMILY MEDICINE

## 2020-10-29 PROCEDURE — G8417 CALC BMI ABV UP PARAM F/U: HCPCS | Performed by: FAMILY MEDICINE

## 2020-10-29 PROCEDURE — G8427 DOCREV CUR MEDS BY ELIG CLIN: HCPCS | Performed by: FAMILY MEDICINE

## 2020-10-29 PROCEDURE — 1123F ACP DISCUSS/DSCN MKR DOCD: CPT | Performed by: FAMILY MEDICINE

## 2020-10-29 PROCEDURE — 99214 OFFICE O/P EST MOD 30 MIN: CPT | Performed by: FAMILY MEDICINE

## 2020-10-29 ASSESSMENT — ENCOUNTER SYMPTOMS
RESPIRATORY NEGATIVE: 1
EYES NEGATIVE: 1
GASTROINTESTINAL NEGATIVE: 1
ALLERGIC/IMMUNOLOGIC NEGATIVE: 1

## 2020-10-29 NOTE — PATIENT INSTRUCTIONS
You may receive a survey regarding the care you received during your visit. Your input is valuable to us. We encourage you to complete and return your survey. We hope you will choose us in the future for your healthcare needs. Continue present medications. Continue present level of physical activity as tolerated.
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
## 2020-10-29 NOTE — PROGRESS NOTES
Visit Information    Have you changed or started any medications since your last visit including any over-the-counter medicines, vitamins, or herbal medicines? no   Are you having any side effects from any of your medications? -  no  Have you stopped taking any of your medications? Is so, why? -  no    Have you seen any other physician or provider since your last visit? Yes - Records Obtained  Have you had any other diagnostic tests since your last visit? Yes - Records Obtained  Have you been seen in the emergency room and/or had an admission to a hospital since we last saw you? Yes - Records Obtained  Have you had your routine dental cleaning in the past 6 months? n/    Have you activated your [employer] account? If not, what are your barriers?  Yes     Patient Care Team:  Sadie Becerra MD as PCP - General (Family Medicine)  Sadie Becerra MD as PCP - [facility] Provider    Medical History Review  Past Medical, Family, and Social History reviewed and does not contribute to the patient presenting condition    Health Maintenance   Topic Date Due    Shingles Vaccine (1 of 2) 01/12/2001    Colon cancer screen colonoscopy  01/12/2001    Pneumococcal 65+ years Vaccine (1 of 1 - PPSV23) 01/12/2016    Flu vaccine (1) 09/01/2020    Lipid screen  03/09/2021    Potassium monitoring  10/21/2021    Creatinine monitoring  10/21/2021    DTaP/Tdap/Td vaccine (2 - Td) 02/26/2029    Hepatitis C screen  Completed    Hepatitis A vaccine  Aged Out    Hepatitis B vaccine  Aged Out    Hib vaccine  Aged Out    Meningococcal (ACWY) vaccine  Aged Out

## 2020-11-04 ENCOUNTER — OFFICE VISIT (OUTPATIENT)
Dept: ONCOLOGY | Age: 70
End: 2020-11-04
Payer: MEDICARE

## 2020-11-04 ENCOUNTER — HOSPITAL ENCOUNTER (OUTPATIENT)
Dept: INFUSION THERAPY | Age: 70
Discharge: HOME | End: 2020-11-04
Payer: MEDICARE

## 2020-11-04 VITALS
HEART RATE: 74 BPM | HEIGHT: 67 IN | WEIGHT: 188.6 LBS | OXYGEN SATURATION: 98 % | DIASTOLIC BLOOD PRESSURE: 76 MMHG | TEMPERATURE: 97.8 F | SYSTOLIC BLOOD PRESSURE: 135 MMHG | BODY MASS INDEX: 29.6 KG/M2 | RESPIRATION RATE: 18 BRPM

## 2020-11-04 PROCEDURE — G8427 DOCREV CUR MEDS BY ELIG CLIN: HCPCS | Performed by: INTERNAL MEDICINE

## 2020-11-04 PROCEDURE — G8417 CALC BMI ABV UP PARAM F/U: HCPCS | Performed by: INTERNAL MEDICINE

## 2020-11-04 PROCEDURE — 99214 OFFICE O/P EST MOD 30 MIN: CPT | Performed by: INTERNAL MEDICINE

## 2020-11-04 PROCEDURE — 99211 OFF/OP EST MAY X REQ PHY/QHP: CPT

## 2020-11-04 PROCEDURE — G8484 FLU IMMUNIZE NO ADMIN: HCPCS | Performed by: INTERNAL MEDICINE

## 2020-11-04 PROCEDURE — 1036F TOBACCO NON-USER: CPT | Performed by: INTERNAL MEDICINE

## 2020-11-04 PROCEDURE — 1123F ACP DISCUSS/DSCN MKR DOCD: CPT | Performed by: INTERNAL MEDICINE

## 2020-11-04 PROCEDURE — 4040F PNEUMOC VAC/ADMIN/RCVD: CPT | Performed by: INTERNAL MEDICINE

## 2020-11-04 PROCEDURE — 3017F COLORECTAL CA SCREEN DOC REV: CPT | Performed by: INTERNAL MEDICINE
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
## 2020-11-24 RX ORDER — PRAVASTATIN SODIUM 40 MG
40 TABLET ORAL DAILY
Qty: 90 TABLET | Refills: 1 | Status: SHIPPED | OUTPATIENT
Start: 2020-11-24 | End: 2022-02-21 | Stop reason: SDUPTHER

## 2021-04-30 ENCOUNTER — OFFICE VISIT (OUTPATIENT)
Dept: CARDIOLOGY CLINIC | Age: 71
End: 2021-04-30
Payer: MEDICARE

## 2021-04-30 VITALS
BODY MASS INDEX: 29.98 KG/M2 | WEIGHT: 191 LBS | HEIGHT: 67 IN | DIASTOLIC BLOOD PRESSURE: 72 MMHG | SYSTOLIC BLOOD PRESSURE: 122 MMHG | HEART RATE: 72 BPM

## 2021-04-30 DIAGNOSIS — R06.02 SOB (SHORTNESS OF BREATH): ICD-10-CM

## 2021-04-30 DIAGNOSIS — I10 ESSENTIAL HYPERTENSION: ICD-10-CM

## 2021-04-30 DIAGNOSIS — I25.10 CORONARY ARTERY DISEASE INVOLVING NATIVE CORONARY ARTERY OF NATIVE HEART WITHOUT ANGINA PECTORIS: Primary | ICD-10-CM

## 2021-04-30 PROCEDURE — 99213 OFFICE O/P EST LOW 20 MIN: CPT | Performed by: NUCLEAR MEDICINE

## 2021-04-30 PROCEDURE — 4040F PNEUMOC VAC/ADMIN/RCVD: CPT | Performed by: NUCLEAR MEDICINE

## 2021-04-30 PROCEDURE — 1123F ACP DISCUSS/DSCN MKR DOCD: CPT | Performed by: NUCLEAR MEDICINE

## 2021-04-30 PROCEDURE — 3017F COLORECTAL CA SCREEN DOC REV: CPT | Performed by: NUCLEAR MEDICINE

## 2021-04-30 PROCEDURE — G8427 DOCREV CUR MEDS BY ELIG CLIN: HCPCS | Performed by: NUCLEAR MEDICINE

## 2021-04-30 PROCEDURE — 1036F TOBACCO NON-USER: CPT | Performed by: NUCLEAR MEDICINE

## 2021-04-30 PROCEDURE — G8417 CALC BMI ABV UP PARAM F/U: HCPCS | Performed by: NUCLEAR MEDICINE

## 2021-04-30 RX ORDER — AMLODIPINE BESYLATE 2.5 MG/1
2.5 TABLET ORAL DAILY
Qty: 90 TABLET | Refills: 3 | Status: SHIPPED | OUTPATIENT
Start: 2021-04-30 | End: 2022-03-11 | Stop reason: SDUPTHER

## 2021-04-30 RX ORDER — CLONIDINE HYDROCHLORIDE 0.2 MG/1
0.1 TABLET ORAL 2 TIMES DAILY
Qty: 90 TABLET | Refills: 3 | Status: SHIPPED | OUTPATIENT
Start: 2021-04-30 | End: 2022-03-11 | Stop reason: SDUPTHER

## 2021-04-30 RX ORDER — METOPROLOL TARTRATE 50 MG/1
50 TABLET, FILM COATED ORAL DAILY
Qty: 90 TABLET | Refills: 3 | Status: SHIPPED | OUTPATIENT
Start: 2021-04-30 | End: 2022-03-11 | Stop reason: SDUPTHER

## 2021-04-30 RX ORDER — SPIRONOLACTONE 50 MG/1
50 TABLET, FILM COATED ORAL DAILY
Qty: 90 TABLET | Refills: 3 | Status: SHIPPED | OUTPATIENT
Start: 2021-04-30 | End: 2022-03-11 | Stop reason: SDUPTHER

## 2021-04-30 NOTE — PROGRESS NOTES
NEEDED FOR WHEEZING OR  SHORTNESS  OF  BREATH 18 g 3    amLODIPine (NORVASC) 2.5 MG tablet Take 1 tablet by mouth daily 90 tablet 3    cloNIDine (CATAPRES) 0.2 MG tablet Take 0.5 tablets by mouth 2 times daily 90 tablet 3    spironolactone (ALDACTONE) 50 MG tablet Take 1 tablet by mouth daily 90 tablet 3    Coenzyme Q10 (COQ10 PO) Take 1 tablet by mouth 2 times daily      Saw [place] 450 MG CAPS Take 1 tablet by mouth 2 times daily      vitamin C (ASCORBIC ACID) 500 MG tablet Take 500 mg by mouth 2 times daily      Vitamin E 180 MG CAPS Take 1 capsule by mouth daily      Misc Natural Products (LUTEIN 20 PO) Take 1 tablet by mouth 2 times daily      SUPER B COMPLEX/C PO Take 1 tablet by mouth daily      Cinnamon 500 MG CAPS Take 1,000 mg by mouth daily      nitroGLYCERIN (NITROSTAT) 0.4 MG SL tablet Place 1 tablet under the tongue every 5 minutes as needed for Chest pain up to max of 3 total doses. If no relief after 1 dose, call 911. 25 tablet 3    aspirin 81 MG chewable tablet Take 81 mg by mouth daily      loratadine (CLARITIN) 10 MG capsule Take 10 mg by mouth daily      hydrochlorothiazide (HYDRODIURIL) 25 MG tablet Take 1 tablet by mouth daily 90 tablet 3    Misc Natural Products (GLUCOSAMINE CHOND COMPLEX/MSM PO) Take by mouth daily      Omega-3 Fatty Acids (FISH OIL PO) Take by mouth daily      Multiple Vitamins-Minerals (MULTIVITAMIN PO) Take by mouth daily      Cholecalciferol (VITAMIN D) 2000 units CAPS capsule Take 4,000 Units by mouth daily       No current facility-administered medications for this visit.       Allergies   Allergen Reactions    Ace Inhibitors Other (See Comments)     cough    Aleve [Naproxen Sodium] Other (See Comments)     Skin peels off    Atorvastatin     Percocet [Oxycodone-Acetaminophen] Other (See Comments)     High BP  Insomnia  hyper     Health Maintenance   Topic Date Due    COVID-19 Vaccine (1) Never done    Shingles Vaccine (1 of 2) Never done   [employer] Colon cancer screen colonoscopy  Never done    Pneumococcal 65+ years Vaccine (1 of 1 - PPSV23) Never done    Lipid screen  03/09/2021    Annual Wellness Visit (AWV)  08/07/2021    Flu vaccine (Season Ended) 09/01/2021    Potassium monitoring  10/21/2021    Creatinine monitoring  10/21/2021    DTaP/Tdap/Td vaccine (2 - Td) 02/26/2029    Hepatitis C screen  Completed    Hepatitis A vaccine  Aged Out    Hepatitis B vaccine  Aged Out    Hib vaccine  Aged Out    Meningococcal (ACWY) vaccine  Aged Out       Subjective:  Review of Systems  General:   No fever, no chills, some fatigue or weight loss  Pulmonary:    some dyspnea, no wheezing  Cardiac:    Denies recent chest pain,   GI:     No nausea or vomiting, no abdominal pain  Neuro:    No dizziness or light headedness,   Musculoskeletal:  No recent active issues  Extremities:   No edema, no obvious claudication       Objective:  Physical Exam  /72   Pulse 72   Ht 5' 7\" (1.702 m)   Wt 191 lb (86.6 kg)   BMI 29.91 kg/m²   General:   Well developed, well nourished  Lungs:   Clear to auscultation  Heart:    Normal S1 S2, Slight murmur. no rubs, no gallops  Abdomen:   Soft, non tender, no organomegalies, positive bowel sounds  Extremities:   No edema, no cyanosis, good peripheral pulses  Neurological:   Awake, alert, oriented. No obvious focal deficits  Musculoskelatal:  No obvious deformities    Assessment:      Diagnosis Orders   1. Coronary artery disease involving native coronary artery of native heart without angina pectoris     2. Essential hypertension     as above  Some dyspnea   Some chest pressure  Higher risk patient       Plan:  No follow-ups on file. Consider nitrates  Consider a follow up stress test   Echo   Continue risk factor modification and medical management  Thank you for allowing me to participate in the care of your patient.  Please don't hesitate to contact me regarding any further issues related to the patient care    Orders

## 2021-05-01 ENCOUNTER — HOSPITAL ENCOUNTER (OUTPATIENT)
Dept: CT IMAGING | Age: 71
Discharge: HOME | End: 2021-05-01
Payer: MEDICARE

## 2021-05-01 DIAGNOSIS — J98.59 MEDIASTINAL MASS: ICD-10-CM

## 2021-05-01 LAB — POC CREATININE WHOLE BLOOD: 1.3 MG/DL (ref 0.5–1.2)

## 2021-05-01 PROCEDURE — 82565 ASSAY OF CREATININE: CPT

## 2021-05-01 PROCEDURE — 6360000004 HC RX CONTRAST MEDICATION: Performed by: INTERNAL MEDICINE

## 2021-05-01 PROCEDURE — 71260 CT THORAX DX C+: CPT

## 2021-05-01 RX ADMIN — IOPAMIDOL 80 ML: 755 INJECTION, SOLUTION INTRAVENOUS at 09:05

## 2021-10-20 NOTE — OP NOTE
[address] [id number]                                OPERATIVE REPORT    PATIENT NAME: Sherlyn Dc                       :        1950  MED REC NO:   [id number]                           ROOM:       [id number]  ACCOUNT NO:   [de-identified]                           ADMIT DATE: 2020  PROVIDER:     RAMY Day OF PROCEDURE:  03/10/2020    INDICATION:  The patient with chest discomfort, status post cath; also  with anterior mediastinal mass. Plan today for EGD to evaluate. ASA CLASSIFICATION:  II.    SURGEON:  Kelly Watts MD    ESTIMATED BLOOD LOSS:  None. DESCRIPTION OF THE PROCEDURE:  The patient was brought to the GI lab. Consent was obtained. Risks involved with the procedure were explained  to the patient. Informed consent was obtained. The patient was  monitored during the procedure with pulse oximetry, blood pressure  monitoring, and oxygen by nasal cannula. Sedation by incremental doses  of IV Versed, total of 3 mg of Versed and 75 mcg of fentanyl given in  increment dose during the procedure to achieve continuous conscious  sedation. PROCEDURE PERFORMED:  EGD with biopsy. Standard video upper scope was advanced under direct vision from the  oral cavity up to the duodenum. Esophagus appeared normal.  No erosion  or ulceration seen. The gastric junction was at 40 cm from the  incisors. Scope was advanced to the stomach. Retroflex examination of  the cardia revealed normal cardia. Gastric mucosa show in the body and  fundus, erosive gastritis, mild, not really severe. Biopsies obtained  from it to evaluate. Antrum, patchy erythema, mild gastritis, not  significant. Duodenum appeared normal.  Scope was withdrawn with no  immediate complication. IMPRESSION:  Erosive gastritis of the fundus and the body, biopsy  obtained, mild, not really severe, and mild gastritis. PLAN:  1. Statement Selected

## 2021-12-07 ENCOUNTER — TELEPHONE (OUTPATIENT)
Dept: FAMILY MEDICINE CLINIC | Age: 71
End: 2021-12-07

## 2021-12-07 ENCOUNTER — VIRTUAL VISIT (OUTPATIENT)
Dept: FAMILY MEDICINE CLINIC | Age: 71
End: 2021-12-07
Payer: MEDICARE

## 2021-12-07 DIAGNOSIS — J45.20 RAD (REACTIVE AIRWAY DISEASE), MILD INTERMITTENT, UNCOMPLICATED: ICD-10-CM

## 2021-12-07 DIAGNOSIS — U07.1 COVID-19: Primary | ICD-10-CM

## 2021-12-07 DIAGNOSIS — U07.1 COVID: ICD-10-CM

## 2021-12-07 LAB
Lab: ABNORMAL
QC PASS/FAIL: ABNORMAL
SARS-COV-2 RDRP RESP QL NAA+PROBE: POSITIVE

## 2021-12-07 PROCEDURE — 87635 SARS-COV-2 COVID-19 AMP PRB: CPT | Performed by: FAMILY MEDICINE

## 2021-12-07 PROCEDURE — 99443 PR PHYS/QHP TELEPHONE EVALUATION 21-30 MIN: CPT | Performed by: FAMILY MEDICINE

## 2021-12-07 RX ORDER — SODIUM CHLORIDE 0.9 % (FLUSH) 0.9 %
5-40 SYRINGE (ML) INJECTION PRN
OUTPATIENT
Start: 2021-12-09

## 2021-12-07 RX ORDER — SODIUM CHLORIDE 9 MG/ML
INJECTION, SOLUTION INTRAVENOUS CONTINUOUS
Status: CANCELLED | OUTPATIENT
Start: 2021-12-09

## 2021-12-07 RX ORDER — ONDANSETRON 2 MG/ML
8 INJECTION INTRAMUSCULAR; INTRAVENOUS
Status: CANCELLED | OUTPATIENT
Start: 2021-12-09

## 2021-12-07 RX ORDER — DEXAMETHASONE 6 MG/1
6 TABLET ORAL DAILY
Qty: 5 TABLET | Refills: 0 | Status: SHIPPED | OUTPATIENT
Start: 2021-12-07 | End: 2021-12-12

## 2021-12-07 RX ORDER — EPINEPHRINE 1 MG/ML
0.3 INJECTION, SOLUTION, CONCENTRATE INTRAVENOUS PRN
Status: CANCELLED | OUTPATIENT
Start: 2021-12-09

## 2021-12-07 RX ORDER — ALBUTEROL SULFATE 90 UG/1
AEROSOL, METERED RESPIRATORY (INHALATION)
Qty: 18 G | Refills: 0 | Status: SHIPPED | OUTPATIENT
Start: 2021-12-07

## 2021-12-07 RX ORDER — ACETAMINOPHEN 325 MG/1
650 TABLET ORAL
Status: CANCELLED | OUTPATIENT
Start: 2021-12-09

## 2021-12-07 RX ORDER — DIPHENHYDRAMINE HYDROCHLORIDE 50 MG/ML
50 INJECTION INTRAMUSCULAR; INTRAVENOUS
Status: CANCELLED | OUTPATIENT
Start: 2021-12-09

## 2021-12-07 RX ORDER — ALBUTEROL SULFATE 90 UG/1
4 AEROSOL, METERED RESPIRATORY (INHALATION) PRN
Status: CANCELLED | OUTPATIENT
Start: 2021-12-09

## 2021-12-07 RX ORDER — SODIUM CHLORIDE 9 MG/ML
25 INJECTION, SOLUTION INTRAVENOUS PRN
OUTPATIENT
Start: 2021-12-09

## 2021-12-07 RX ORDER — HEPARIN SODIUM (PORCINE) LOCK FLUSH IV SOLN 100 UNIT/ML 100 UNIT/ML
500 SOLUTION INTRAVENOUS PRN
OUTPATIENT
Start: 2021-12-09

## 2021-12-07 ASSESSMENT — ENCOUNTER SYMPTOMS
SINUS PRESSURE: 1
GASTROINTESTINAL NEGATIVE: 1
SHORTNESS OF BREATH: 1
CHEST TIGHTNESS: 1
RHINORRHEA: 1
COUGH: 1

## 2021-12-07 NOTE — TELEPHONE ENCOUNTER
I phoned [facility], spoke with [employer]. Orders given and she will have Out Pt Nursing call pt to schedule appt.

## 2021-12-07 NOTE — TELEPHONE ENCOUNTER
----- Message from Effie Romo DO sent at 12/7/2021 11:37 AM EST -----  Regarding: Uma Eduardo is interested in [address], please order.

## 2021-12-07 NOTE — PROGRESS NOTES
Rapid POCT PCR COVID test ran/complete at 10:56 am.  Patient tested + COVID. Lot number: [id number], alere instrument serial number: [id number].

## 2021-12-07 NOTE — PROGRESS NOTES
Miguelangel Dominguez (:  1950) is a 80 y.o. male,Established patient, here for evaluation of the following chief complaint(s): Congestion and Cough  Miguelangel Dominguez is a 80 y.o. male evaluated via telephone on 2021. Consent:  He and/or health care decision maker is aware that that he may receive a bill for this telephone service, depending on his insurance coverage, and has provided verbal consent to proceed: Yes      Documentation:  I communicated with the patient and/or health care decision maker about concern for COVID. Details of this discussion including any medical advice provided: see A/P      I affirm this is a Patient Initiated Episode with a Patient who has not had a related appointment within my department in the past 7 days or scheduled within the next 24 hours. Patient identification was verified at the start of the visit: Yes    Total Time: minutes: 21-30 minutes    The visit was conducted pursuant to the emergency declaration under the [address] [place] [address] authority and the [employer] General Act. Patient identification was verified, and a caregiver was present when appropriate. The patient was located in a state where the provider was credentialed to provide care. Note: not billable if this call serves to triage the patient into an appointment for the relevant concern      Colt Hong DO                 SUBJECTIVE/OBJECTIVE:  HPI:    Chief Complaint   Patient presents with    Congestion    Cough     Pt presents today with c/o SOB, body aches since . No fevers, checking daily. Pt questions whether or not he may have COVID. He is unvaccinated.     Patient Active Problem List   Diagnosis    HTN (hypertension)    AR (allergic rhinitis)    RAD (reactive airway disease)    Lower respiratory infection    Medication monitoring encounter    Obesity, Class I, BMI 30-34.9    FH: prostate cancer, father and brother.  S/P left inguinal hernia repair    S/P orchiectomy    Chest pain with high risk for cardiac etiology    Unstable angina pectoris ([address]    COVID     Past Surgical History:   Procedure Laterality Date    EGD  2020    HERNIA REPAIR  5/30/12    [facility] Dr. Leslie Anderson  2-4-14    Repair recurrent incarcerated left inguinal hernia with mesh    MOUTH SURGERY      several years ago-removed 2-3 back teeth-wisdom teeth    UPPER GASTROINTESTINAL ENDOSCOPY N/A 3/10/2020    EGD BIOPSY performed by Delaney Barker MD at [facility]     Social History     Tobacco Use    Smoking status: Never Smoker    Smokeless tobacco: Never Used   Vaping Use    Vaping Use: Never used   Substance Use Topics    Alcohol use: No    Drug use: No         Review of Systems   Constitutional: Positive for fatigue. Negative for fever. HENT: Positive for congestion, rhinorrhea and sinus pressure. Respiratory: Positive for cough, chest tightness and shortness of breath. Cardiovascular: Negative. Gastrointestinal: Negative. Musculoskeletal: Positive for myalgias. All other systems reviewed and are negative. Patient-Reported Vitals 8/5/2020   Patient-Reported Weight 191   Patient-Reported Height 5'5\"   Patient-Reported Systolic 432   Patient-Reported Diastolic 71   Patient-Reported Pulse 52   Patient-Reported Temperature 98.2   Patient-Reported SpO2 95        Physical Exam  Constitutional:       General: He is not in acute distress. Pulmonary:      Effort: Pulmonary effort is normal. No respiratory distress. Neurological:      Mental Status: Mental status is at baseline. Psychiatric:         Mood and Affect: Mood normal.         Behavior: Behavior normal.         Thought Content: Thought content normal.         Judgment: Judgment normal.     ASSESSMENT/PLAN:  1. COVID-19  -     dexamethasone (DECADRON) 6 MG tablet;  Take 1 tablet by mouth daily for 5 days, Disp-5 tablet, R-0Normal  -     albuterol sulfate  (90 Base) MCG/ACT inhaler; INHALE 1 TO 2 PUFFS BY MOUTH EVERY 4 HOURS AS NEEDED FOR WHEEZING OR  SHORTNESS  OF  BREATH, Disp-18 g, R-0Please consider 90 day supplies to promote better adherenceNormal  2. RAD (reactive airway disease), mild intermittent, uncomplicated    -  COVID + in office today per rapid PCR  -  Orders above  -  Schedule Regeneron, EUA reviewed    Return if symptoms worsen or fail to improve. On this date 12/7/2021 I have spent 21-30 minutes reviewing previous notes, test results and face to face (virtual) with the patient discussing the diagnosis and importance of compliance with the treatment plan as well as documenting on the day of the visit. [place] Wang, was evaluated through a synchronous (real-time) audio-video encounter. The patient (or guardian if applicable) is aware that this is a billable service. Verbal consent to proceed has been obtained within the past 12 months. The visit was conducted pursuant to the emergency declaration under the [address] [place] [address], [address] [place] [address] authority and the [employer] General Act. Patient identification was verified, and a caregiver was present when appropriate. The patient was located in a state where the provider was credentialed to provide care. An electronic signature was used to authenticate this note.     --Sheela Dinh, DO

## 2021-12-09 ENCOUNTER — HOSPITAL ENCOUNTER (OUTPATIENT)
Dept: NURSING | Age: 71
Discharge: HOME | End: 2021-12-09
Payer: MEDICARE

## 2021-12-09 VITALS
OXYGEN SATURATION: 92 % | SYSTOLIC BLOOD PRESSURE: 120 MMHG | RESPIRATION RATE: 18 BRPM | HEART RATE: 81 BPM | DIASTOLIC BLOOD PRESSURE: 59 MMHG | TEMPERATURE: 99.5 F

## 2021-12-09 DIAGNOSIS — U07.1 COVID: Primary | ICD-10-CM

## 2021-12-09 PROCEDURE — M0245 HC IV INFUSION BAMLANIVIMAB & ETESEVIMAB W/MONITORING: HCPCS

## 2021-12-09 PROCEDURE — 2500000003 HC RX 250 WO HCPCS: Performed by: FAMILY MEDICINE

## 2021-12-09 PROCEDURE — 2580000003 HC RX 258: Performed by: FAMILY MEDICINE

## 2021-12-09 PROCEDURE — 6360000002 HC RX W HCPCS: Performed by: FAMILY MEDICINE

## 2021-12-09 RX ORDER — DIPHENHYDRAMINE HYDROCHLORIDE 50 MG/ML
50 INJECTION INTRAMUSCULAR; INTRAVENOUS
Status: CANCELLED | OUTPATIENT
Start: 2021-12-09

## 2021-12-09 RX ORDER — ONDANSETRON 2 MG/ML
8 INJECTION INTRAMUSCULAR; INTRAVENOUS
Status: ACTIVE | OUTPATIENT
Start: 2021-12-09 | End: 2021-12-09

## 2021-12-09 RX ORDER — ONDANSETRON 2 MG/ML
8 INJECTION INTRAMUSCULAR; INTRAVENOUS
Status: CANCELLED | OUTPATIENT
Start: 2021-12-09

## 2021-12-09 RX ORDER — SODIUM CHLORIDE 0.9 % (FLUSH) 0.9 %
5-40 SYRINGE (ML) INJECTION PRN
OUTPATIENT
Start: 2021-12-09

## 2021-12-09 RX ORDER — SODIUM CHLORIDE 9 MG/ML
INJECTION, SOLUTION INTRAVENOUS CONTINUOUS
Status: DISCONTINUED | OUTPATIENT
Start: 2021-12-09 | End: 2021-12-10 | Stop reason: HOSPADM

## 2021-12-09 RX ORDER — SODIUM CHLORIDE 9 MG/ML
25 INJECTION, SOLUTION INTRAVENOUS PRN
OUTPATIENT
Start: 2021-12-09

## 2021-12-09 RX ORDER — ALBUTEROL SULFATE 90 UG/1
4 AEROSOL, METERED RESPIRATORY (INHALATION) PRN
Status: CANCELLED | OUTPATIENT
Start: 2021-12-09

## 2021-12-09 RX ORDER — HEPARIN SODIUM (PORCINE) LOCK FLUSH IV SOLN 100 UNIT/ML 100 UNIT/ML
500 SOLUTION INTRAVENOUS PRN
OUTPATIENT
Start: 2021-12-09

## 2021-12-09 RX ORDER — ALBUTEROL SULFATE 90 UG/1
4 AEROSOL, METERED RESPIRATORY (INHALATION) PRN
Status: DISCONTINUED | OUTPATIENT
Start: 2021-12-09 | End: 2021-12-10 | Stop reason: HOSPADM

## 2021-12-09 RX ORDER — ACETAMINOPHEN 325 MG/1
650 TABLET ORAL
Status: CANCELLED | OUTPATIENT
Start: 2021-12-09

## 2021-12-09 RX ORDER — ACETAMINOPHEN 325 MG/1
650 TABLET ORAL
Status: ACTIVE | OUTPATIENT
Start: 2021-12-09 | End: 2021-12-09

## 2021-12-09 RX ORDER — DIPHENHYDRAMINE HYDROCHLORIDE 50 MG/ML
50 INJECTION INTRAMUSCULAR; INTRAVENOUS
Status: ACTIVE | OUTPATIENT
Start: 2021-12-09 | End: 2021-12-09

## 2021-12-09 RX ORDER — SODIUM CHLORIDE 9 MG/ML
INJECTION, SOLUTION INTRAVENOUS CONTINUOUS
Status: CANCELLED | OUTPATIENT
Start: 2021-12-09

## 2021-12-09 RX ADMIN — SODIUM CHLORIDE: 9 INJECTION, SOLUTION INTRAVENOUS at 11:35

## 2021-12-09 RX ADMIN — SODIUM CHLORIDE: 9 INJECTION, SOLUTION INTRAVENOUS at 11:36

## 2021-12-09 ASSESSMENT — PAIN SCALES - GENERAL
PAINLEVEL_OUTOF10: 0
PAINLEVEL_OUTOF10: 0

## 2021-12-09 ASSESSMENT — PAIN - FUNCTIONAL ASSESSMENT: PAIN_FUNCTIONAL_ASSESSMENT: 0-10

## 2021-12-09 NOTE — PROGRESS NOTES
1132 Patient arrived to [facility] ambulatory for covid infusion. Oriented to room and call light  PT RIGHTS AND RESPONSIBILITIES OFFERED TO PT.  EUA given and explained to patient    1136 Medication started and he denies complaints    1207 Medication completed and he denies complaints    1307 Patient denies complaints. Iv removed. Discharge instructions given and explained and he denies questions.   Discharged ambulatory    _M___ Safety:       (Environmental)  [place] Schaumann to environment   Ensure ID band is correct and in place/ allergy band as needed   Assess for fall risk   Initiate fall precautions as applicable (fall band, side rails, etc.)   Call light within reach   Bed in low position/ wheels locked    _M___ Pain:        Assess pain level and characteristics   Administer analgesics as ordered   Assess effectiveness of pain management and report to MD as needed    _M___ Knowledge Deficit:   Assess baseline knowledge   Provide teaching at level of understanding   Provide teaching via preferred learning method   Evaluate teaching effectiveness    _M___ Hemodynamic/Respiratory Status:       (Pre and Post Procedure Monitoring)   Assess/Monitor vital signs and LOC   Assess Baseline SpO2 prior to any sedation   Obtain weight/height   Assess vital signs/ LOC until patient meets discharge criteria   Monitor procedure site and notify MD of any issues

## 2022-02-21 RX ORDER — PRAVASTATIN SODIUM 40 MG
40 TABLET ORAL DAILY
Qty: 90 TABLET | Refills: 0 | Status: SHIPPED | OUTPATIENT
Start: 2022-02-21 | End: 2022-03-11 | Stop reason: SDUPTHER

## 2022-03-11 ENCOUNTER — OFFICE VISIT (OUTPATIENT)
Dept: CARDIOLOGY CLINIC | Age: 72
End: 2022-03-11
Payer: MEDICARE

## 2022-03-11 VITALS
SYSTOLIC BLOOD PRESSURE: 110 MMHG | HEIGHT: 67 IN | HEART RATE: 67 BPM | WEIGHT: 195.8 LBS | BODY MASS INDEX: 30.73 KG/M2 | DIASTOLIC BLOOD PRESSURE: 68 MMHG

## 2022-03-11 DIAGNOSIS — I25.10 CORONARY ARTERY DISEASE INVOLVING NATIVE CORONARY ARTERY OF NATIVE HEART WITHOUT ANGINA PECTORIS: Primary | ICD-10-CM

## 2022-03-11 DIAGNOSIS — I10 ESSENTIAL HYPERTENSION: ICD-10-CM

## 2022-03-11 DIAGNOSIS — R06.02 SOB (SHORTNESS OF BREATH): ICD-10-CM

## 2022-03-11 PROCEDURE — G8484 FLU IMMUNIZE NO ADMIN: HCPCS | Performed by: NUCLEAR MEDICINE

## 2022-03-11 PROCEDURE — 99213 OFFICE O/P EST LOW 20 MIN: CPT | Performed by: NUCLEAR MEDICINE

## 2022-03-11 PROCEDURE — G8427 DOCREV CUR MEDS BY ELIG CLIN: HCPCS | Performed by: NUCLEAR MEDICINE

## 2022-03-11 PROCEDURE — 1036F TOBACCO NON-USER: CPT | Performed by: NUCLEAR MEDICINE

## 2022-03-11 PROCEDURE — 3017F COLORECTAL CA SCREEN DOC REV: CPT | Performed by: NUCLEAR MEDICINE

## 2022-03-11 PROCEDURE — 4040F PNEUMOC VAC/ADMIN/RCVD: CPT | Performed by: NUCLEAR MEDICINE

## 2022-03-11 PROCEDURE — G8417 CALC BMI ABV UP PARAM F/U: HCPCS | Performed by: NUCLEAR MEDICINE

## 2022-03-11 PROCEDURE — 93000 ELECTROCARDIOGRAM COMPLETE: CPT | Performed by: NUCLEAR MEDICINE

## 2022-03-11 PROCEDURE — 1123F ACP DISCUSS/DSCN MKR DOCD: CPT | Performed by: NUCLEAR MEDICINE

## 2022-03-11 RX ORDER — PRAVASTATIN SODIUM 40 MG
40 TABLET ORAL DAILY
Qty: 90 TABLET | Refills: 3 | Status: SHIPPED | OUTPATIENT
Start: 2022-03-11 | End: 2022-04-26

## 2022-03-11 RX ORDER — SPIRONOLACTONE 50 MG/1
50 TABLET, FILM COATED ORAL DAILY
Qty: 90 TABLET | Refills: 3 | Status: SHIPPED | OUTPATIENT
Start: 2022-03-11 | End: 2022-04-26

## 2022-03-11 RX ORDER — METOPROLOL TARTRATE 50 MG/1
50 TABLET, FILM COATED ORAL DAILY
Qty: 90 TABLET | Refills: 3 | Status: SHIPPED | OUTPATIENT
Start: 2022-03-11 | End: 2022-04-26

## 2022-03-11 RX ORDER — CLONIDINE HYDROCHLORIDE 0.2 MG/1
0.1 TABLET ORAL 2 TIMES DAILY
Qty: 90 TABLET | Refills: 3 | Status: SHIPPED | OUTPATIENT
Start: 2022-03-11

## 2022-03-11 RX ORDER — AMLODIPINE BESYLATE 2.5 MG/1
2.5 TABLET ORAL DAILY
Qty: 90 TABLET | Refills: 3 | Status: SHIPPED | OUTPATIENT
Start: 2022-03-11

## 2022-03-11 NOTE — PROGRESS NOTES
[id number] [facility]  [address]  [facility] [address]  Dept: [phone]  Dept Fax: [phone]  Loc: [phone]    Visit Date: 3/11/2022    Hermelindo Dawkins is a 71 y.o. 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 Jersey Baker  2-4-14    Repair recurrent incarcerated left inguinal hernia with mesh    MOUTH SURGERY      several years ago-removed 2-3 back teeth-wisdom teeth    UPPER GASTROINTESTINAL ENDOSCOPY N/A 3/10/2020    EGD BIOPSY performed by Colton Jimenez MD at [facility]     Family History   Problem Relation Age of Onset    Alzheimer's Disease Mother     High Blood Pressure Mother     Cancer Father         prostate    Cancer Sister         breast cancer-twin    Stomach Cancer Paternal Grandmother     Colon Cancer Neg Hx     Esophageal Cancer Neg Hx     Liver Cancer Neg Hx     Rectal Cancer Neg Hx      Social History     Tobacco Use    Smoking status: Never Smoker    Smokeless tobacco: Never Used   Substance Use Topics    Alcohol use: No      Current Outpatient Medications   Medication Sig Dispense Refill    pravastatin (PRAVACHOL) 40 MG tablet Take 1 tablet by mouth daily 90 tablet 0    albuterol sulfate  (90 Base) MCG/ACT inhaler INHALE 1 TO 2 PUFFS BY MOUTH EVERY 4 HOURS AS NEEDED FOR WHEEZING OR  SHORTNESS  OF  BREATH 18 g 0    spironolactone (ALDACTONE) 50 MG tablet Take 1 tablet by mouth daily 90 tablet 3    metoprolol tartrate (LOPRESSOR) 50 MG tablet Take 1 tablet by mouth daily 90 tablet 3    cloNIDine (CATAPRES) 0.2 MG tablet Take 0.5 tablets by mouth 2 times daily 90 tablet 3    amLODIPine (NORVASC) 2.5 MG tablet Take 1 tablet by mouth daily 90 tablet 3    Coenzyme Q10 (COQ10 PO) Take 1 tablet by mouth 2 times daily      Saw [place] 450 MG CAPS Take 1 tablet by mouth 2 times daily      vitamin C (ASCORBIC ACID) 500 MG tablet Take 500 mg by mouth 2 times daily      Vitamin E 180 MG CAPS Take 1 capsule by mouth daily      Misc Natural Products (LUTEIN 20 PO) Take 1 tablet by mouth 2 times daily      SUPER B COMPLEX/C PO Take 1 tablet by mouth daily      Cinnamon 500 MG CAPS Take 1,000 mg by mouth daily      nitroGLYCERIN (NITROSTAT) 0.4 MG SL tablet Place 1 tablet under the tongue every 5 minutes as needed for Chest pain up to max of 3 total doses. If no relief after 1 dose, call 911. 25 tablet 3    aspirin 81 MG chewable tablet Take 81 mg by mouth daily      loratadine (CLARITIN) 10 MG capsule Take 10 mg by mouth daily      hydrochlorothiazide (HYDRODIURIL) 25 MG tablet Take 1 tablet by mouth daily 90 tablet 3    Misc Natural Products (GLUCOSAMINE CHOND COMPLEX/MSM PO) Take by mouth daily      Omega-3 Fatty Acids (FISH OIL PO) Take by mouth daily      Multiple Vitamins-Minerals (MULTIVITAMIN PO) Take by mouth daily      Cholecalciferol (VITAMIN D) 2000 units CAPS capsule Take 4,000 Units by mouth daily       No current facility-administered medications for this visit.      Allergies   Allergen Reactions    Ace Inhibitors Other (See Comments)     cough    Aleve [Naproxen Sodium] Other (See Comments)     Skin peels off    Atorvastatin     Percocet [Oxycodone-Acetaminophen] Other (See Comments)     High BP  Insomnia  hyper     Health Maintenance   Topic Date Due    COVID-19 Vaccine (1) Never done    Depression Screen  Never done    Colorectal Cancer Screen  Never done    Shingles Vaccine (1 of 2) Never done    Pneumococcal 65+ years Vaccine (1 of 1 - PPSV23) Never done    Lipid screen  03/09/2021    Annual Wellness Visit (AWV)  08/07/2021    Flu vaccine (1) Never done    Potassium monitoring  10/21/2021    Creatinine monitoring  10/21/2021    DTaP/Tdap/Td vaccine (2 - Td or Tdap) 02/26/2029    Hepatitis C screen  Completed    Hepatitis A vaccine  Aged Out    Hepatitis B vaccine  Aged Out    Hib vaccine  Aged Out    Meningococcal (ACWY) vaccine  Aged Out       Subjective:  Review of Systems  General:   No fever, no chills, No fatigue or weight loss  Pulmonary:    No dyspnea, no wheezing  Cardiac:    Denies recent chest pain,   GI:     No nausea or vomiting, no abdominal pain  Neuro:    No dizziness or light headedness,   Musculoskeletal:  No recent active issues  Extremities:   No edema, no obvious claudication       Objective:  Physical Exam  /68   Pulse 67   Ht 5' 7\" (1.702 m)   Wt 195 lb 12.8 oz (88.8 kg)   BMI 30.67 kg/m²   General:   Well developed, well nourished  Lungs:   Clear to auscultation  Heart:    Normal S1 S2, Slight murmur. no rubs, no gallops  Abdomen:   Soft, non tender, no organomegalies, positive bowel sounds  Extremities:   No edema, no cyanosis, good peripheral pulses  Neurological:   Awake, alert, oriented. No obvious focal deficits  Musculoskelatal:  No obvious deformities    Assessment:      Diagnosis Orders   1. Coronary artery disease involving native coronary artery of native heart without angina pectoris  EKG 12 lead   2. Essential hypertension  EKG 12 lead   3. SOB (shortness of breath)  EKG 12 lead   as above  Cardiac fair for now   ECG in office was done today. I reviewed the ECG. No acute findings    Plan:  No follow-ups on file. As above  Continue risk factor modification and medical management  Thank you for allowing me to participate in the care of your patient.  Please don't hesitate to contact me regarding any further issues related to the patient care    Orders Placed:  Orders Placed This Encounter Procedures    EKG 12 lead     Order Specific Question:   Reason for Exam?     Answer: Other       Medications Prescribed:  No orders of the defined types were placed in this encounter. Discussed use, benefit, and side effects of prescribed medications. All patient questions answered. Pt voicedunderstanding. Instructed to continue current medications, diet and exercise. Continue risk factor modification and medical management. Patient agreed with treatment plan. Follow up as directed.     Electronically signedby Mila Caceres MD on 3/11/2022 at 1:30 PM

## 2022-04-26 RX ORDER — METOPROLOL TARTRATE 50 MG/1
TABLET, FILM COATED ORAL
Qty: 90 TABLET | Refills: 0 | Status: SHIPPED | OUTPATIENT
Start: 2022-04-26

## 2022-04-26 RX ORDER — PRAVASTATIN SODIUM 40 MG
TABLET ORAL
Qty: 90 TABLET | Refills: 0 | Status: SHIPPED | OUTPATIENT
Start: 2022-04-26

## 2022-04-26 RX ORDER — SPIRONOLACTONE 50 MG/1
TABLET, FILM COATED ORAL
Qty: 90 TABLET | Refills: 0 | Status: SHIPPED | OUTPATIENT
Start: 2022-04-26
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
## 2023-01-04 DIAGNOSIS — U07.1 COVID-19: ICD-10-CM

## 2023-01-04 RX ORDER — ALBUTEROL SULFATE 90 UG/1
AEROSOL, METERED RESPIRATORY (INHALATION)
Qty: 18 G | Refills: 1 | Status: SHIPPED | OUTPATIENT
Start: 2023-01-04

## 2023-01-04 NOTE — TELEPHONE ENCOUNTER
Fax received from [facility] requesting a refill on the patients Albuterol HFA. Order pended for your signature.

## 2023-04-27 ENCOUNTER — OFFICE VISIT (OUTPATIENT)
Dept: CARDIOLOGY CLINIC | Age: 73
End: 2023-04-27
Payer: MEDICARE

## 2023-04-27 VITALS
HEART RATE: 67 BPM | SYSTOLIC BLOOD PRESSURE: 120 MMHG | BODY MASS INDEX: 29.19 KG/M2 | HEIGHT: 67 IN | WEIGHT: 186 LBS | DIASTOLIC BLOOD PRESSURE: 73 MMHG

## 2023-04-27 DIAGNOSIS — E78.01 FAMILIAL HYPERCHOLESTEROLEMIA: ICD-10-CM

## 2023-04-27 DIAGNOSIS — I10 ESSENTIAL HYPERTENSION: ICD-10-CM

## 2023-04-27 DIAGNOSIS — R06.02 SOB (SHORTNESS OF BREATH): ICD-10-CM

## 2023-04-27 DIAGNOSIS — I25.10 CORONARY ARTERY DISEASE INVOLVING NATIVE CORONARY ARTERY OF NATIVE HEART WITHOUT ANGINA PECTORIS: Primary | ICD-10-CM

## 2023-04-27 PROCEDURE — 3078F DIAST BP <80 MM HG: CPT | Performed by: NUCLEAR MEDICINE

## 2023-04-27 PROCEDURE — 3017F COLORECTAL CA SCREEN DOC REV: CPT | Performed by: NUCLEAR MEDICINE

## 2023-04-27 PROCEDURE — 93000 ELECTROCARDIOGRAM COMPLETE: CPT | Performed by: NUCLEAR MEDICINE

## 2023-04-27 PROCEDURE — G8419 CALC BMI OUT NRM PARAM NOF/U: HCPCS | Performed by: NUCLEAR MEDICINE

## 2023-04-27 PROCEDURE — 1036F TOBACCO NON-USER: CPT | Performed by: NUCLEAR MEDICINE

## 2023-04-27 PROCEDURE — 99214 OFFICE O/P EST MOD 30 MIN: CPT | Performed by: NUCLEAR MEDICINE

## 2023-04-27 PROCEDURE — 3074F SYST BP LT 130 MM HG: CPT | Performed by: NUCLEAR MEDICINE

## 2023-04-27 PROCEDURE — G8428 CUR MEDS NOT DOCUMENT: HCPCS | Performed by: NUCLEAR MEDICINE

## 2023-04-27 PROCEDURE — 1123F ACP DISCUSS/DSCN MKR DOCD: CPT | Performed by: NUCLEAR MEDICINE

## 2023-04-27 NOTE — PROGRESS NOTES
[address]  [address]  [place] [address]  Dept: [phone]  Dept Fax: [phone]  Loc: [phone]    Visit Date: 4/27/2023    Donn Santos is a 68 y.o. male who presents todayfor:  Chief Complaint   Patient presents with    Coronary Artery Disease    Hypertension    Hyperlipidemia   Known mo derate CAD  No chest pain   No changes in breathing  No active angina  He is active  BP is stable  No dizziness  On medical Rx  Does have hyperlipidemia  On statins for that         HPI:  HPI  Past Medical History:   Diagnosis Date    Hypertension       Past Surgical History:   Procedure Laterality Date    EGD  2020    HERNIA REPAIR  5/30/12    [facility] Dr. Neha Rouse  2-4-14    Repair recurrent incarcerated left inguinal hernia with mesh    MOUTH SURGERY      several years ago-removed 2-3 back teeth-wisdom teeth    UPPER GASTROINTESTINAL ENDOSCOPY N/A 3/10/2020    EGD BIOPSY performed by Gage Carter MD at [facility]     Family History   Problem Relation Age of Onset    Alzheimer's Disease Mother     High Blood Pressure Mother     Cancer Father         prostate    Cancer Sister         breast cancer-twin    Stomach Cancer Paternal Grandmother     Colon Cancer Neg Hx     Esophageal Cancer Neg Hx     Liver Cancer Neg Hx     Rectal Cancer Neg Hx      Social History     Tobacco Use    Smoking status: Never    Smokeless tobacco: Never   Substance Use Topics    Alcohol use: No      Current Outpatient Medications   Medication Sig Dispense Refill    albuterol sulfate HFA (PROVENTIL;VENTOLIN;PROAIR) 108 (90 Base) MCG/ACT inhaler INHALE 1 TO 2 PUFFS BY MOUTH EVERY 4 HOURS AS NEEDED FOR WHEEZING OR  SHORTNESS  OF  BREATH 18 g 1    spironolactone (ALDACTONE) 50 MG tablet Take 1 tablet by mouth once daily 90 tablet 0    metoprolol tartrate (LOPRESSOR) 50 MG tablet Take 1 tablet by mouth once daily 90 tablet 0    pravastatin (PRAVACHOL) 40

## 2023-09-20 RX ORDER — CLONIDINE HYDROCHLORIDE 0.2 MG/1
TABLET ORAL
Qty: 90 TABLET | Refills: 2 | Status: SHIPPED | OUTPATIENT
Start: 2023-09-20

## 2024-01-02 RX ORDER — AMLODIPINE BESYLATE 2.5 MG/1
2.5 TABLET ORAL DAILY
Qty: 90 TABLET | Refills: 1 | Status: SHIPPED | OUTPATIENT
Start: 2024-01-02

## 2024-04-25 ENCOUNTER — OFFICE VISIT (OUTPATIENT)
Dept: CARDIOLOGY CLINIC | Age: 74
End: 2024-04-25
Payer: MEDICARE

## 2024-04-25 VITALS
HEART RATE: 71 BPM | DIASTOLIC BLOOD PRESSURE: 64 MMHG | HEIGHT: 67 IN | WEIGHT: 188 LBS | BODY MASS INDEX: 29.51 KG/M2 | SYSTOLIC BLOOD PRESSURE: 124 MMHG

## 2024-04-25 DIAGNOSIS — I10 PRIMARY HYPERTENSION: ICD-10-CM

## 2024-04-25 DIAGNOSIS — I25.10 CORONARY ARTERY DISEASE INVOLVING NATIVE CORONARY ARTERY OF NATIVE HEART WITHOUT ANGINA PECTORIS: Primary | ICD-10-CM

## 2024-04-25 DIAGNOSIS — E78.01 FAMILIAL HYPERCHOLESTEROLEMIA: ICD-10-CM

## 2024-04-25 PROCEDURE — 3017F COLORECTAL CA SCREEN DOC REV: CPT | Performed by: NUCLEAR MEDICINE

## 2024-04-25 PROCEDURE — 3078F DIAST BP <80 MM HG: CPT | Performed by: NUCLEAR MEDICINE

## 2024-04-25 PROCEDURE — 4004F PT TOBACCO SCREEN RCVD TLK: CPT | Performed by: NUCLEAR MEDICINE

## 2024-04-25 PROCEDURE — 1123F ACP DISCUSS/DSCN MKR DOCD: CPT | Performed by: NUCLEAR MEDICINE

## 2024-04-25 PROCEDURE — G8427 DOCREV CUR MEDS BY ELIG CLIN: HCPCS | Performed by: NUCLEAR MEDICINE

## 2024-04-25 PROCEDURE — 93000 ELECTROCARDIOGRAM COMPLETE: CPT | Performed by: NUCLEAR MEDICINE

## 2024-04-25 PROCEDURE — 3074F SYST BP LT 130 MM HG: CPT | Performed by: NUCLEAR MEDICINE

## 2024-04-25 PROCEDURE — G8419 CALC BMI OUT NRM PARAM NOF/U: HCPCS | Performed by: NUCLEAR MEDICINE

## 2024-04-25 PROCEDURE — 99213 OFFICE O/P EST LOW 20 MIN: CPT | Performed by: NUCLEAR MEDICINE

## 2024-04-25 NOTE — PROGRESS NOTES
[facility] [place] [facility]  [facility] CARDIOLOGY  [address]  [address]  [facility] [address]  Dept: [phone]  Dept Fax: [phone]  Loc: [phone]    Visit Date: 4/25/2024    Felipe Andres is a 74 y.o. male who presents todayfor:  Chief Complaint   Patient presents with    Check-Up    Hypertension    Coronary Artery Disease    Hyperlipidemia   Know moderate CAD  No chest pain  No changes in breathing  He is active  BP is stable  No dizziness  No syncope  All together he is good       HPI:  HPI  Past Medical History:   Diagnosis Date    Hypertension       Past Surgical History:   Procedure Laterality Date    EGD  2020    HERNIA REPAIR  5/30/12    [facility] Dr. Rodriguez     HERNIA REPAIR  2-4-14    Repair recurrent incarcerated left inguinal hernia with mesh    MOUTH SURGERY      several years ago-removed 2-3 back teeth-wisdom teeth    UPPER GASTROINTESTINAL ENDOSCOPY N/A 3/10/2020    EGD BIOPSY performed by Tommie Moyer MD at [facility]     Family History   Problem Relation Age of Onset    Alzheimer's Disease Mother     High Blood Pressure Mother     Cancer Father         prostate    Cancer Sister         breast cancer-twin    Stomach Cancer Paternal Grandmother     Colon Cancer Neg Hx     Esophageal Cancer Neg Hx     Liver Cancer Neg Hx     Rectal Cancer Neg Hx      Social History     Tobacco Use    Smoking status: Never    Smokeless tobacco: Never   Substance Use Topics    Alcohol use: No      Current Outpatient Medications   Medication Sig Dispense Refill    amLODIPine (NORVASC) 2.5 MG tablet Take 1 tablet by mouth once daily 90 tablet 1    cloNIDine (CATAPRES) 0.2 MG tablet Take 1/2 (one-half) tablet by mouth twice daily 90 tablet 2    albuterol sulfate HFA (PROVENTIL;VENTOLIN;PROAIR) 108 (90 Base) MCG/ACT inhaler INHALE 1 TO 2 PUFFS BY MOUTH EVERY 4 HOURS AS NEEDED FOR WHEEZING OR  SHORTNESS  OF  BREATH 18 g 1    spironolactone (ALDACTONE) 50 MG tablet Take 1 tablet by mouth

## 2024-05-23 RX ORDER — SPIRONOLACTONE 50 MG/1
TABLET, FILM COATED ORAL
Qty: 90 TABLET | Refills: 3 | Status: SHIPPED | OUTPATIENT
Start: 2024-05-23

## 2024-06-26 RX ORDER — AMLODIPINE BESYLATE 2.5 MG/1
2.5 TABLET ORAL DAILY
Qty: 90 TABLET | Refills: 3 | Status: SHIPPED | OUTPATIENT
Start: 2024-06-26

## 2024-07-05 RX ORDER — METOPROLOL TARTRATE 50 MG/1
TABLET, FILM COATED ORAL
Qty: 90 TABLET | Refills: 3 | Status: SHIPPED | OUTPATIENT
Start: 2024-07-05

## 2024-08-19 RX ORDER — PRAVASTATIN SODIUM 40 MG
TABLET ORAL
Qty: 90 TABLET | Refills: 0 | Status: SHIPPED | OUTPATIENT
Start: 2024-08-19

## 2024-11-13 RX ORDER — PRAVASTATIN SODIUM 40 MG
TABLET ORAL
Qty: 90 TABLET | Refills: 2 | Status: SHIPPED | OUTPATIENT
Start: 2024-11-13

## 2025-03-13 RX ORDER — CLONIDINE HYDROCHLORIDE 0.2 MG/1
TABLET ORAL
Qty: 90 TABLET | Refills: 1 | Status: SHIPPED | OUTPATIENT
Start: 2025-03-13

## 2025-05-08 ENCOUNTER — OFFICE VISIT (OUTPATIENT)
Dept: CARDIOLOGY CLINIC | Age: 75
End: 2025-05-08
Payer: MEDICARE

## 2025-05-08 VITALS
DIASTOLIC BLOOD PRESSURE: 70 MMHG | WEIGHT: 181 LBS | BODY MASS INDEX: 28.41 KG/M2 | HEIGHT: 67 IN | HEART RATE: 80 BPM | SYSTOLIC BLOOD PRESSURE: 142 MMHG

## 2025-05-08 DIAGNOSIS — E78.01 FAMILIAL HYPERCHOLESTEROLEMIA: ICD-10-CM

## 2025-05-08 DIAGNOSIS — I10 PRIMARY HYPERTENSION: ICD-10-CM

## 2025-05-08 DIAGNOSIS — I25.10 CORONARY ARTERY DISEASE INVOLVING NATIVE CORONARY ARTERY OF NATIVE HEART WITHOUT ANGINA PECTORIS: Primary | ICD-10-CM

## 2025-05-08 PROCEDURE — 93000 ELECTROCARDIOGRAM COMPLETE: CPT | Performed by: NUCLEAR MEDICINE

## 2025-05-08 PROCEDURE — 3078F DIAST BP <80 MM HG: CPT | Performed by: NUCLEAR MEDICINE

## 2025-05-08 PROCEDURE — 99213 OFFICE O/P EST LOW 20 MIN: CPT | Performed by: NUCLEAR MEDICINE

## 2025-05-08 PROCEDURE — 3077F SYST BP >= 140 MM HG: CPT | Performed by: NUCLEAR MEDICINE

## 2025-05-08 PROCEDURE — 3017F COLORECTAL CA SCREEN DOC REV: CPT | Performed by: NUCLEAR MEDICINE

## 2025-05-08 PROCEDURE — G8428 CUR MEDS NOT DOCUMENT: HCPCS | Performed by: NUCLEAR MEDICINE

## 2025-05-08 PROCEDURE — 1123F ACP DISCUSS/DSCN MKR DOCD: CPT | Performed by: NUCLEAR MEDICINE

## 2025-05-08 PROCEDURE — 4004F PT TOBACCO SCREEN RCVD TLK: CPT | Performed by: NUCLEAR MEDICINE

## 2025-05-08 PROCEDURE — G8419 CALC BMI OUT NRM PARAM NOF/U: HCPCS | Performed by: NUCLEAR MEDICINE

## 2025-05-08 NOTE — PROGRESS NOTES
placed in this encounter.         Discussed use, benefit, and side effects of prescribed medications. All patient questions answered. Pt voicedunderstanding. Instructed to continue current medications, diet and exercise. Continue risk factor modification and medical management. Patient agreed with treatment plan. Follow up as directed.    Electronically signedby Shane Mcarthur MD on 5/8/2025 at 1:34 PM

## 2025-05-14 ENCOUNTER — LAB (OUTPATIENT)
Dept: LAB | Age: 75
End: 2025-05-14

## 2025-05-14 DIAGNOSIS — E78.01 FAMILIAL HYPERCHOLESTEROLEMIA: ICD-10-CM

## 2025-05-14 DIAGNOSIS — I10 PRIMARY HYPERTENSION: ICD-10-CM

## 2025-05-14 DIAGNOSIS — I25.10 CORONARY ARTERY DISEASE INVOLVING NATIVE CORONARY ARTERY OF NATIVE HEART WITHOUT ANGINA PECTORIS: ICD-10-CM

## 2025-05-14 LAB
ALBUMIN SERPL BCG-MCNC: 4.1 G/DL (ref 3.4–4.9)
ALP SERPL-CCNC: 52 U/L (ref 40–129)
ALT SERPL W/O P-5'-P-CCNC: 23 U/L (ref 10–50)
AST SERPL-CCNC: 22 U/L (ref 10–50)
BILIRUB CONJ SERPL-MCNC: 0.2 MG/DL (ref 0–0.2)
BILIRUB SERPL-MCNC: 0.3 MG/DL (ref 0.3–1.2)
CHOLEST SERPL-MCNC: 163 MG/DL (ref 100–199)
HDLC SERPL-MCNC: 64 MG/DL
LDLC SERPL CALC-MCNC: 88 MG/DL
PROT SERPL-MCNC: 6.8 G/DL (ref 6.4–8.3)
TRIGL SERPL-MCNC: 56 MG/DL (ref 0–199)

## 2025-09-03 RX ORDER — CLONIDINE HYDROCHLORIDE 0.2 MG/1
TABLET ORAL
Qty: 90 TABLET | Refills: 2 | Status: SHIPPED | OUTPATIENT
Start: 2025-09-03